# Patient Record
Sex: MALE | Race: WHITE | NOT HISPANIC OR LATINO | Employment: FULL TIME | URBAN - METROPOLITAN AREA
[De-identification: names, ages, dates, MRNs, and addresses within clinical notes are randomized per-mention and may not be internally consistent; named-entity substitution may affect disease eponyms.]

---

## 2020-11-04 ENCOUNTER — OFFICE VISIT (OUTPATIENT)
Dept: ENDOCRINOLOGY | Facility: CLINIC | Age: 50
End: 2020-11-04

## 2020-11-04 VITALS
OXYGEN SATURATION: 98 % | WEIGHT: 248.2 LBS | HEIGHT: 69 IN | BODY MASS INDEX: 36.76 KG/M2 | HEART RATE: 101 BPM | DIASTOLIC BLOOD PRESSURE: 78 MMHG | SYSTOLIC BLOOD PRESSURE: 128 MMHG

## 2020-11-04 DIAGNOSIS — IMO0002 DIABETES MELLITUS TYPE 1, UNCONTROLLED, WITH COMPLICATIONS: Primary | ICD-10-CM

## 2020-11-04 LAB — HBA1C MFR BLD: 6.4 %

## 2020-11-04 PROCEDURE — 99212 OFFICE O/P EST SF 10 MIN: CPT | Performed by: INTERNAL MEDICINE

## 2020-11-04 PROCEDURE — 83036 HEMOGLOBIN GLYCOSYLATED A1C: CPT | Performed by: INTERNAL MEDICINE

## 2020-11-04 RX ORDER — ROSUVASTATIN CALCIUM 20 MG/1
20 TABLET, COATED ORAL DAILY
Qty: 90 TABLET | Refills: 3 | Status: SHIPPED | OUTPATIENT
Start: 2020-11-04 | End: 2021-12-02

## 2020-11-04 RX ORDER — INSULIN ASPART INJECTION 100 [IU]/ML
INJECTION, SOLUTION SUBCUTANEOUS
Qty: 25 PEN | Refills: 11 | Status: SHIPPED | OUTPATIENT
Start: 2020-11-04 | End: 2021-10-05

## 2020-11-04 RX ORDER — INSULIN ASPART INJECTION 100 [IU]/ML
INJECTION, SOLUTION SUBCUTANEOUS
COMMUNITY
End: 2020-11-04 | Stop reason: SDUPTHER

## 2020-11-04 RX ORDER — ROSUVASTATIN CALCIUM 20 MG/1
20 TABLET, COATED ORAL DAILY
COMMUNITY
End: 2020-11-04 | Stop reason: SDUPTHER

## 2020-11-04 RX ORDER — LISINOPRIL 10 MG/1
10 TABLET ORAL DAILY
Qty: 90 TABLET | Refills: 3 | Status: SHIPPED | OUTPATIENT
Start: 2020-11-04 | End: 2021-12-02

## 2020-11-04 RX ORDER — INSULIN GLARGINE 300 U/ML
30 INJECTION, SOLUTION SUBCUTANEOUS 2 TIMES DAILY
Qty: 2 PEN | Refills: 11 | Status: SHIPPED | OUTPATIENT
Start: 2020-11-04 | End: 2021-12-02

## 2020-11-04 RX ORDER — INSULIN GLARGINE 300 U/ML
30 INJECTION, SOLUTION SUBCUTANEOUS 2 TIMES DAILY
COMMUNITY
End: 2020-11-04 | Stop reason: SDUPTHER

## 2020-11-04 RX ORDER — LISINOPRIL 10 MG/1
10 TABLET ORAL DAILY
COMMUNITY
End: 2020-11-04 | Stop reason: SDUPTHER

## 2020-11-04 NOTE — PROGRESS NOTES
"     Office Note      Date: 2020  Patient Name: Mendez Deluna  MRN: [id number]  : 1970    Chief Complaint   Patient presents with   • Follow-up   • Diabetes       History of Present Illness:   Mendez eDluna is a 49 y.o. male who presents for Diabetes type 1. Diagnosed in: . Treated in past with insulin. Current treatments:  fiasp and toujeo  Number of insulin shots per day: none. Checks blood sugar >4 times a day. Has low blood sugar: rare.     Last A1c:  Hemoglobin A1C   Date Value Ref Range Status   2020 6.4 % Final       Changes in health since last visit: none . Last eye exam up to date.    Subjective      Diabetic Complications:  Eyes: No  Kidneys: No  Feet: No  Heart: No    Diet and Exercise:  Meals per day: 3  Minutes of exercise per week: 150 mins.    Review of Systems:   Review of Systems   Constitutional: Negative.    Respiratory: Negative.    Genitourinary: Negative.    Neurological: Negative.        The following portions of the patient's history were reviewed and updated as appropriate: allergies, current medications, past family history, past medical history, past social history, past surgical history and problem list.    Objective     Visit Vitals  /78   Pulse 101   Ht 174 cm (68.5\")   Wt 113 kg (248 lb 3.2 oz)   SpO2 98%   BMI 37.19 kg/m²       Labs:    CMP  No results found for: GLUCOSE, BUN, CREATININE, EGFRIFNONA, EGFRIFAFRI, BCR, K, CO2, CALCIUM, PROTENTOTREF, LABIL2, BILIRUBIN, AST, ALT     CBC w/DIFF  No results found for: WBC, RBC, HGB, HCT, MCV, MCH, MCHC, RDW, RDWSD, MPV, PLT, NEUTRORELPCT, LYMPHORELPCT, MONORELPCT, EOSRELPCT, BASORELPCT, AUTOIGPER, NEUTROABS, LYMPHSABS, MONOSABS, EOSABS, BASOSABS, AUTOIGNUM, NRBC    Physical Exam:  Physical Exam  Constitutional:       Appearance: Normal appearance. He is obese.   Neck:      Musculoskeletal: Normal range of motion and neck supple.   Musculoskeletal: Normal range of motion.   Skin:     General: Skin is warm and " dry.   Neurological:      Mental Status: He is alert.   Psychiatric:         Mood and Affect: Mood normal.         Thought Content: Thought content normal.         Judgment: Judgment normal.          Assessment / Plan      Assessment & Plan:  Problem List Items Addressed This Visit        Endocrine    Diabetes mellitus type 1, uncontrolled, with complications (CMS/[facility]) - Primary    Relevant Medications    Insulin Glargine, 2 Unit Dial, (Toujeo Max SoloStar) 300 UNIT/ML solution pen-injector injection    Insulin aspart (FIASP FLEXTOUCH) 100 UNIT/ML solution pen-injector injection pen    Other Relevant Orders    POC Glycosylated Hemoglobin (Hb A1C) (Completed)           Everardo Troncoso MD   11/04/2020

## 2020-12-09 ENCOUNTER — PATIENT MESSAGE (OUTPATIENT)
Dept: ENDOCRINOLOGY | Facility: CLINIC | Age: 50
End: 2020-12-09

## 2020-12-10 RX ORDER — BLOOD SUGAR DIAGNOSTIC
STRIP MISCELLANEOUS
Qty: 150 EACH | Refills: 5 | Status: SHIPPED | OUTPATIENT
Start: 2020-12-10 | End: 2021-12-29

## 2020-12-10 NOTE — TELEPHONE ENCOUNTER
From: Mendez Deulna  To: Everardo Troncoso MD  Sent: 12/9/2020 3:09 PM EST  Subject: Prescription Question    I attempted to find test strips for my OneTouch Verio Flex in this system’s database to request a refill, but they did not show up. This is the glucose meter I had been using and I am out of strips. Is there a test meter to which I should switch? If not, how can I get strip refills for the Verio Flex meter?

## 2021-01-12 ENCOUNTER — IMMUNIZATION (OUTPATIENT)
Dept: VACCINE CLINIC | Facility: HOSPITAL | Age: 51
End: 2021-01-12

## 2021-01-12 PROCEDURE — 91300 HC SARSCOV02 VAC 30MCG/0.3ML IM: CPT | Performed by: FAMILY MEDICINE

## 2021-01-12 PROCEDURE — 0001A: CPT | Performed by: FAMILY MEDICINE

## 2021-02-02 ENCOUNTER — IMMUNIZATION (OUTPATIENT)
Dept: VACCINE CLINIC | Facility: HOSPITAL | Age: 51
End: 2021-02-02

## 2021-02-02 PROCEDURE — 0002A: CPT | Performed by: INTERNAL MEDICINE

## 2021-02-02 PROCEDURE — 91300 HC SARSCOV02 VAC 30MCG/0.3ML IM: CPT | Performed by: INTERNAL MEDICINE

## 2021-02-22 ENCOUNTER — TELEPHONE (OUTPATIENT)
Dept: ENDOCRINOLOGY | Facility: CLINIC | Age: 51
End: 2021-02-22

## 2021-02-22 NOTE — TELEPHONE ENCOUNTER
PT CALLED REQUESTING A REFILL OF THE Acquia MISHEL 2 SENSORS. REFILL TO BE SENT IN TO [facility]. PLEASE CONTACT THE PT WHEN RX IS SENT. PT IS OUT. THANK YOU.

## 2021-02-25 DIAGNOSIS — IMO0002 DIABETES MELLITUS TYPE 1, UNCONTROLLED, WITH COMPLICATIONS: Primary | ICD-10-CM

## 2021-03-01 NOTE — TELEPHONE ENCOUNTER
----- Message from Mendez Deluna sent at 2/28/2021 11:03 PM EST -----  Regarding: Prescription Question  Contact: [phone]  Hi Dr. Troncoso, I am sending this message to the Rx messaging as well, but it’s of a bit of urgency. I went to my pharmacy today and got my Freestyle Ronnie, and apparently I have been switched to the Ronnie 2. I did not realize this until sticking the bee Ronnie 2 onto my arm. The problem is that I do not have a reader for the Ronnie 2 to start the device. So, it’s in my arm but doing nothing at the moment. Can you have the Rx team quickly send my [facility] pharmacy a prescription for the new reader for me so I can pick it up ASAP?    Thanks much and happy Monday!

## 2021-03-30 RX ORDER — FLASH GLUCOSE SENSOR
1 KIT MISCELLANEOUS
Qty: 2 EACH | Refills: 5 | Status: SHIPPED | OUTPATIENT
Start: 2021-03-30 | End: 2022-01-10

## 2021-05-06 ENCOUNTER — OFFICE VISIT (OUTPATIENT)
Dept: ENDOCRINOLOGY | Facility: CLINIC | Age: 51
End: 2021-05-06

## 2021-05-06 VITALS
TEMPERATURE: 97.7 F | WEIGHT: 261 LBS | HEART RATE: 100 BPM | DIASTOLIC BLOOD PRESSURE: 50 MMHG | HEIGHT: 68 IN | SYSTOLIC BLOOD PRESSURE: 124 MMHG | OXYGEN SATURATION: 97 % | BODY MASS INDEX: 39.56 KG/M2

## 2021-05-06 DIAGNOSIS — E10.649 UNCONTROLLED TYPE 1 DIABETES MELLITUS WITH HYPOGLYCEMIA WITHOUT COMA (HCC): Primary | ICD-10-CM

## 2021-05-06 PROBLEM — IMO0002 DIABETES MELLITUS TYPE 1, UNCONTROLLED, WITH COMPLICATIONS: Status: RESOLVED | Noted: 2020-11-04 | Resolved: 2021-05-06

## 2021-05-06 LAB
EXPIRATION DATE: NORMAL
HBA1C MFR BLD: 6.1 %
Lab: NORMAL

## 2021-05-06 PROCEDURE — 83036 HEMOGLOBIN GLYCOSYLATED A1C: CPT | Performed by: INTERNAL MEDICINE

## 2021-05-06 PROCEDURE — 99213 OFFICE O/P EST LOW 20 MIN: CPT | Performed by: INTERNAL MEDICINE

## 2021-05-06 NOTE — PROGRESS NOTES
"     Office Note      Date: 2021  Patient Name: Mendez Deluna  MRN: [id number]  : 1970    Chief Complaint   Patient presents with   • Diabetes       History of Present Illness:   Mendez Deluna is a 50 y.o. male who presents for Diabetes - type 1.  He is on toujeo and fiasp.  He uses a lan to check bg and adjusts his insulin 10 times per day. He has an naa that alerts him  He has occasional hypos.  He has not had serious hypos     Last A1c:  Hemoglobin A1C   Date Value Ref Range Status   2021 6.1 % Final       Changes in health since last visit: he is fully vaccinated . Last eye exam due for eye exam   Feet- good. .    Subjective              Review of Systems:   Review of Systems   Constitutional: Negative.    HENT: Negative.    Eyes: Negative.    All other systems reviewed and are negative.      The following portions of the patient's history were reviewed and updated as appropriate: allergies, current medications, past family history, past medical history, past social history, past surgical history and problem list.    Objective     Visit Vitals  /50 (BP Location: Left arm, Patient Position: Sitting, Cuff Size: Adult)   Pulse 100   Temp 97.7 °F (36.5 °C) (Infrared)   Ht 172.7 cm (68\")   Wt 118 kg (261 lb)   SpO2 97%   BMI 39.68 kg/m²       Labs:    CMP  No results found for: GLUCOSE, BUN, CREATININE, EGFRIFNONA, EGFRIFAFRI, BCR, K, CO2, CALCIUM, PROTENTOTREF, LABIL2, BILIRUBIN, AST, ALT     CBC w/DIFF  No results found for: WBC, RBC, HGB, HCT, MCV, MCH, MCHC, RDW, RDWSD, MPV, PLT, NEUTRORELPCT, LYMPHORELPCT, MONORELPCT, EOSRELPCT, BASORELPCT, AUTOIGPER, NEUTROABS, LYMPHSABS, MONOSABS, EOSABS, BASOSABS, AUTOIGNUM, NRBC    Physical Exam:  Physical Exam  Vitals reviewed.   HENT:      Head: Normocephalic and atraumatic.   Psychiatric:         Mood and Affect: Mood normal.         Thought Content: Thought content normal.         Judgment: Judgment normal.          Assessment / Plan  "     Assessment & Plan:  Problem List Items Addressed This Visit        Other    Uncontrolled type 1 diabetes mellitus with hypoglycemia without coma (CMS/[facility]) - Primary    Current Assessment & Plan     a1c at goal  No changes are needed.            Relevant Medications    Insulin aspart (FIASP FLEXTOUCH) 100 UNIT/ML solution pen-injector injection pen    Insulin Glargine, 2 Unit Dial, (Toujeo Max SoloStar) 300 UNIT/ML solution pen-injector injection    Other Relevant Orders    POC Glycosylated Hemoglobin (Hb A1C) (Completed)           Everardo Troncoso MD   05/06/2021

## 2021-05-27 ENCOUNTER — PRIOR AUTHORIZATION (OUTPATIENT)
Dept: ENDOCRINOLOGY | Facility: CLINIC | Age: 51
End: 2021-05-27

## 2021-05-28 RX ORDER — PEN NEEDLE, DIABETIC 31 GX5/16"
NEEDLE, DISPOSABLE MISCELLANEOUS
Qty: 200 EACH | Refills: 6 | Status: SHIPPED | OUTPATIENT
Start: 2021-05-28 | End: 2022-06-01

## 2021-06-16 ENCOUNTER — TELEPHONE (OUTPATIENT)
Dept: ENDOCRINOLOGY | Facility: CLINIC | Age: 51
End: 2021-06-16

## 2021-06-16 NOTE — TELEPHONE ENCOUNTER
BLANCA PURI III (Key: [id number])  Fiasp FlexTouch 100UNIT/ML pen-injectors     Form  [employer] PA Form (2017 NCPDP)  Created  2 days ago  Sent to Plan  less than a minute ago  Plan Response  less than a minute ago  Submit Clinical Questions  Determination  N/A  Message from Plan  Authorization already on file for this request.

## 2021-06-30 ENCOUNTER — TELEPHONE (OUTPATIENT)
Dept: ENDOCRINOLOGY | Facility: CLINIC | Age: 51
End: 2021-06-30

## 2021-07-01 NOTE — TELEPHONE ENCOUNTER
Patient returned call and given message.   [FreeTextEntry1] : Hypertension likely related to beta-blocker withdrawal.  Resume beta-blocker follow-up with nephrologist

## 2021-07-19 ENCOUNTER — OFFICE VISIT (OUTPATIENT)
Dept: FAMILY MEDICINE CLINIC | Facility: CLINIC | Age: 51
End: 2021-07-19

## 2021-07-19 VITALS
OXYGEN SATURATION: 98 % | WEIGHT: 257.8 LBS | BODY MASS INDEX: 39.07 KG/M2 | DIASTOLIC BLOOD PRESSURE: 58 MMHG | SYSTOLIC BLOOD PRESSURE: 140 MMHG | HEART RATE: 96 BPM | TEMPERATURE: 97.3 F | HEIGHT: 68 IN

## 2021-07-19 DIAGNOSIS — E10.65 TYPE 1 DIABETES MELLITUS WITH HYPERGLYCEMIA (HCC): Primary | ICD-10-CM

## 2021-07-19 DIAGNOSIS — R60.0 BILATERAL LEG EDEMA: ICD-10-CM

## 2021-07-19 DIAGNOSIS — Z12.5 ENCOUNTER FOR SCREENING FOR MALIGNANT NEOPLASM OF PROSTATE: ICD-10-CM

## 2021-07-19 DIAGNOSIS — E10.69 HYPERLIPIDEMIA DUE TO TYPE 1 DIABETES MELLITUS (HCC): ICD-10-CM

## 2021-07-19 DIAGNOSIS — E78.5 HYPERLIPIDEMIA DUE TO TYPE 1 DIABETES MELLITUS (HCC): ICD-10-CM

## 2021-07-19 PROCEDURE — 93000 ELECTROCARDIOGRAM COMPLETE: CPT | Performed by: FAMILY MEDICINE

## 2021-07-19 PROCEDURE — 99204 OFFICE O/P NEW MOD 45 MIN: CPT | Performed by: FAMILY MEDICINE

## 2021-07-20 ENCOUNTER — LAB (OUTPATIENT)
Dept: FAMILY MEDICINE CLINIC | Facility: CLINIC | Age: 51
End: 2021-07-20

## 2021-07-20 DIAGNOSIS — E78.5 HYPERLIPIDEMIA DUE TO TYPE 1 DIABETES MELLITUS (HCC): ICD-10-CM

## 2021-07-20 DIAGNOSIS — E10.65 TYPE 1 DIABETES MELLITUS WITH HYPERGLYCEMIA (HCC): ICD-10-CM

## 2021-07-20 DIAGNOSIS — E10.69 HYPERLIPIDEMIA DUE TO TYPE 1 DIABETES MELLITUS (HCC): ICD-10-CM

## 2021-07-20 DIAGNOSIS — Z12.5 ENCOUNTER FOR SCREENING FOR MALIGNANT NEOPLASM OF PROSTATE: ICD-10-CM

## 2021-07-20 DIAGNOSIS — R60.0 BILATERAL LEG EDEMA: ICD-10-CM

## 2021-07-20 PROCEDURE — 84443 ASSAY THYROID STIM HORMONE: CPT | Performed by: FAMILY MEDICINE

## 2021-07-20 PROCEDURE — 83880 ASSAY OF NATRIURETIC PEPTIDE: CPT | Performed by: FAMILY MEDICINE

## 2021-07-20 PROCEDURE — 83036 HEMOGLOBIN GLYCOSYLATED A1C: CPT | Performed by: FAMILY MEDICINE

## 2021-07-20 PROCEDURE — 80061 LIPID PANEL: CPT | Performed by: FAMILY MEDICINE

## 2021-07-20 PROCEDURE — 84439 ASSAY OF FREE THYROXINE: CPT | Performed by: FAMILY MEDICINE

## 2021-07-20 PROCEDURE — G0103 PSA SCREENING: HCPCS | Performed by: FAMILY MEDICINE

## 2021-07-20 PROCEDURE — 82043 UR ALBUMIN QUANTITATIVE: CPT | Performed by: FAMILY MEDICINE

## 2021-07-20 PROCEDURE — 85025 COMPLETE CBC W/AUTO DIFF WBC: CPT | Performed by: FAMILY MEDICINE

## 2021-07-20 PROCEDURE — 80053 COMPREHEN METABOLIC PANEL: CPT | Performed by: FAMILY MEDICINE

## 2021-07-20 PROCEDURE — 36415 COLL VENOUS BLD VENIPUNCTURE: CPT

## 2021-07-21 LAB
ALBUMIN SERPL-MCNC: 4.3 G/DL (ref 3.5–5.2)
ALBUMIN UR-MCNC: <1.2 MG/DL
ALBUMIN/GLOB SERPL: 1.4 G/DL
ALP SERPL-CCNC: 53 U/L (ref 39–117)
ALT SERPL W P-5'-P-CCNC: 39 U/L (ref 1–41)
ANION GAP SERPL CALCULATED.3IONS-SCNC: 11 MMOL/L (ref 5–15)
AST SERPL-CCNC: 29 U/L (ref 1–40)
BASOPHILS # BLD AUTO: 0.02 10*3/MM3 (ref 0–0.2)
BASOPHILS NFR BLD AUTO: 0.3 % (ref 0–1.5)
BILIRUB SERPL-MCNC: 0.5 MG/DL (ref 0–1.2)
BUN SERPL-MCNC: 10 MG/DL (ref 6–20)
BUN/CREAT SERPL: 11.4 (ref 7–25)
CALCIUM SPEC-SCNC: 8.8 MG/DL (ref 8.6–10.5)
CHLORIDE SERPL-SCNC: 97 MMOL/L (ref 98–107)
CHOLEST SERPL-MCNC: 170 MG/DL (ref 0–200)
CO2 SERPL-SCNC: 27 MMOL/L (ref 22–29)
CREAT SERPL-MCNC: 0.88 MG/DL (ref 0.76–1.27)
DEPRECATED RDW RBC AUTO: 41.9 FL (ref 37–54)
EOSINOPHIL # BLD AUTO: 0.07 10*3/MM3 (ref 0–0.4)
EOSINOPHIL NFR BLD AUTO: 1 % (ref 0.3–6.2)
ERYTHROCYTE [DISTWIDTH] IN BLOOD BY AUTOMATED COUNT: 12.7 % (ref 12.3–15.4)
GFR SERPL CREATININE-BSD FRML MDRD: 91 ML/MIN/1.73
GLOBULIN UR ELPH-MCNC: 3 GM/DL
GLUCOSE SERPL-MCNC: 176 MG/DL (ref 65–99)
HBA1C MFR BLD: 6.3 % (ref 4.8–5.6)
HCT VFR BLD AUTO: 46.1 % (ref 37.5–51)
HDLC SERPL-MCNC: 39 MG/DL (ref 40–60)
HGB BLD-MCNC: 15.5 G/DL (ref 13–17.7)
IMM GRANULOCYTES # BLD AUTO: 0.02 10*3/MM3 (ref 0–0.05)
IMM GRANULOCYTES NFR BLD AUTO: 0.3 % (ref 0–0.5)
LDLC SERPL CALC-MCNC: 113 MG/DL (ref 0–100)
LDLC/HDLC SERPL: 2.87 {RATIO}
LYMPHOCYTES # BLD AUTO: 1.2 10*3/MM3 (ref 0.7–3.1)
LYMPHOCYTES NFR BLD AUTO: 16.9 % (ref 19.6–45.3)
MCH RBC QN AUTO: 29.9 PG (ref 26.6–33)
MCHC RBC AUTO-ENTMCNC: 33.6 G/DL (ref 31.5–35.7)
MCV RBC AUTO: 89 FL (ref 79–97)
MONOCYTES # BLD AUTO: 0.67 10*3/MM3 (ref 0.1–0.9)
MONOCYTES NFR BLD AUTO: 9.4 % (ref 5–12)
NEUTROPHILS NFR BLD AUTO: 5.13 10*3/MM3 (ref 1.7–7)
NEUTROPHILS NFR BLD AUTO: 72.1 % (ref 42.7–76)
NRBC BLD AUTO-RTO: 0 /100 WBC (ref 0–0.2)
NT-PROBNP SERPL-MCNC: <5 PG/ML (ref 0–900)
PLATELET # BLD AUTO: 192 10*3/MM3 (ref 140–450)
PMV BLD AUTO: 12.4 FL (ref 6–12)
POTASSIUM SERPL-SCNC: 4 MMOL/L (ref 3.5–5.2)
PROT SERPL-MCNC: 7.3 G/DL (ref 6–8.5)
PSA SERPL-MCNC: 0.64 NG/ML (ref 0–4)
RBC # BLD AUTO: 5.18 10*6/MM3 (ref 4.14–5.8)
SODIUM SERPL-SCNC: 135 MMOL/L (ref 136–145)
T4 FREE SERPL-MCNC: 1.09 NG/DL (ref 0.93–1.7)
TRIGL SERPL-MCNC: 96 MG/DL (ref 0–150)
TSH SERPL DL<=0.05 MIU/L-ACNC: 1.06 UIU/ML (ref 0.27–4.2)
VLDLC SERPL-MCNC: 18 MG/DL (ref 5–40)
WBC # BLD AUTO: 7.11 10*3/MM3 (ref 3.4–10.8)

## 2021-08-17 ENCOUNTER — TELEPHONE (OUTPATIENT)
Dept: FAMILY MEDICINE CLINIC | Facility: CLINIC | Age: 51
End: 2021-08-17

## 2021-08-18 NOTE — TELEPHONE ENCOUNTER
For him, unfortunately yes, but not until 8 months after his previous shot.      Left a message to return call.      Left a second message to return call.    Patient notified.

## 2021-09-09 ENCOUNTER — OFFICE VISIT (OUTPATIENT)
Dept: FAMILY MEDICINE CLINIC | Facility: CLINIC | Age: 51
End: 2021-09-09

## 2021-09-09 VITALS
OXYGEN SATURATION: 98 % | HEIGHT: 68 IN | WEIGHT: 262.4 LBS | DIASTOLIC BLOOD PRESSURE: 62 MMHG | SYSTOLIC BLOOD PRESSURE: 120 MMHG | BODY MASS INDEX: 39.77 KG/M2 | TEMPERATURE: 97.1 F | HEART RATE: 104 BPM

## 2021-09-09 DIAGNOSIS — M25.532 LEFT WRIST PAIN: Primary | ICD-10-CM

## 2021-09-09 DIAGNOSIS — R60.0 LOCALIZED EDEMA: ICD-10-CM

## 2021-09-09 DIAGNOSIS — M65.4 TENOSYNOVITIS, DE QUERVAIN: ICD-10-CM

## 2021-09-09 PROCEDURE — 99214 OFFICE O/P EST MOD 30 MIN: CPT | Performed by: FAMILY MEDICINE

## 2021-09-09 RX ORDER — FUROSEMIDE 20 MG/1
20 TABLET ORAL DAILY PRN
Qty: 30 TABLET | Refills: 2 | Status: SHIPPED | OUTPATIENT
Start: 2021-09-09 | End: 2021-12-14 | Stop reason: DRUGHIGH

## 2021-09-09 NOTE — PROGRESS NOTES
"Mendez Deluna     VITALS: Blood pressure 120/62, pulse 104, temperature 97.1 °F (36.2 °C), height 172.7 cm (67.99\"), weight 119 kg (262 lb 6.4 oz), SpO2 98 %.    Subjective  Chief Complaint  Wrist Pain    Subjective          History of Present Illness:  Patient is a 50 y.o.  male with medical conditions significant for type 1 diabetes, hyperlipidemia, and chronic back pain who presents to clinic secondary to medical followup.     The labs from the patient's last visit were within normal limits. He states that his left wrist has been bothering him more, especially when he puts it in the wrong position and at nighttime. He denies that it is painful; however, he feels as if something is wrong. He reports that he continued to use it when he first noticed his wrist and then he suddenly had increased symptoms and his carpal tunnel symptoms have worsened as well. He denies falling on his left wrist; however, he uses it more than his right.    He reports that he does need to lose weight; however, his work keeps him from sleeping normal hours or having time to exercise. He is wanting to know the underlying cause of his water retention and would like to begin taking Lasix. He reports that he has taken a low dose of Lasix in the past and did well on them. He states that he closely monitors his glucose levels and denies having any syncope episodes.     He reports that he is having some issues with his insurance coverage.     No complaints about any of the medications.    The following portions of the patient's history were reviewed and updated as appropriate: allergies, current medications, past family history, past medical history, past social history, past surgical history and problem list.    Past Medical History  Past Medical History:   Diagnosis Date   • Acquired trigger finger    • Back pain    • Chest pain    • Hypercholesterolemia    • Type 1 diabetes mellitus (CMS/formerly [facility])        Surgical History  Past Surgical History: " "  Procedure Laterality Date   • WISDOM TOOTH EXTRACTION         Family History  Family History   Problem Relation Age of Onset   • Cancer Mother    • Hyperlipidemia Mother    • Thyroid cancer Mother    • Diabetes Father    • Hyperlipidemia Father    • Heart disease Father         Quad bypass       Social History  Social History     Socioeconomic History   • Marital status: Single     Spouse name: Not on file   • Number of children: Not on file   • Years of education: Not on file   • Highest education level: Not on file   Tobacco Use   • Smoking status: Never Smoker   • Smokeless tobacco: Never Used   Vaping Use   • Vaping Use: Never used   Substance and Sexual Activity   • Alcohol use: Yes     Comment: occasionally   • Drug use: Never   • Sexual activity: Defer       Objective   Vital Signs:   /62 (BP Location: Right arm, Patient Position: Sitting, Cuff Size: Adult)   Pulse 104   Temp 97.1 °F (36.2 °C)   Ht 172.7 cm (67.99\")   Wt 119 kg (262 lb 6.4 oz)   SpO2 98%   BMI 39.91 kg/m²     Physical Exam     Gen: Patient in NAD. Pleasant and answers appropriately. A&Ox3.    Skin: Warm and dry with normal turgor. No purpura, rashes, or unusual pigmentation noted. Hair is normal in appearance and distribution.    HEENT: NC/AT. No lesions noted. Conjunctiva clear, sclera nonicteric. PERRL. EOMI without nystagmus or strabismus. Fundi appear benign. No hemorrhages or exudates of eyes. Auditory canals are patent bilaterally without lesions. TMs intact,  nonerythematous, nonbulging without lesions. Nasal mucosa pink, nonerythematous, and nonedematous. Frontal and maxillary sinuses are nontender. O/P erythematous and moist without exudate.    Neck: Supple without lymph nodes palpated. FROM.     Lungs: CTA B/L without rales, rhonchi, crackles, or wheezes.    Heart: RRR. S1 and S2 normal. No S3 or S4. No MRGT.    Abd: Soft, nontender,nondistended. (+)BSx4 quadrants.     Extrem: No CC.  Slight edema to bilateral lower " extremities.  Radial pulses 2+/4 and equal B/L. FROMx4.  Left upper extremity: Painful in the anatomic snuffbox    Neuro: No focal motor/sensory deficits.    Procedures         Assessment and Plan    Mendez Deluna is a 50 y.o. here for medical followup.    Problem List Items Addressed This Visit     None      Visit Diagnoses     Left wrist pain    -  Primary    Relevant Orders    XR Hand 3+ View Left    De Quervain tenosynovitis          I have provided the patient with home exercises.     Localized edema           I have prescribed him Lasix.            Patient's Body mass index is 39.91 kg/m². indicating that he is obese (BMI >30). Obesity-related health conditions include the following: hypertension and dyslipidemias. Obesity is unchanged. BMI is is above average; BMI management plan is completed. We discussed portion control and increasing exercise..         Follow Up   Return in about 3 months (around 12/9/2021), or XRAY.  Findings and plans discussed with patient who verbalizes understanding and agreement. Will followup with patient once results are in. Patient was given instructions and counseling regarding his condition or for health maintenance advice. Please see specific information pulled into the AVS if appropriate.       Transcribed from ambient dictation for Magalis Malcolm MD by Eddy Sarmiento.  09/09/21   15:06 EDT    I have personally performed the services described in this document as transcribed by the above individual, and it is both accurate and complete.  Magalis Malcolm MD  9/27/2021  07:33 EDT

## 2021-09-10 ENCOUNTER — APPOINTMENT (OUTPATIENT)
Dept: VACCINE CLINIC | Facility: HOSPITAL | Age: 51
End: 2021-09-10

## 2021-09-13 ENCOUNTER — TELEPHONE (OUTPATIENT)
Dept: FAMILY MEDICINE CLINIC | Facility: CLINIC | Age: 51
End: 2021-09-13

## 2021-09-13 NOTE — TELEPHONE ENCOUNTER
----- Message from Magalis Malcolm MD sent at 9/12/2021 12:50 PM EDT -----  Labs stable. Okay to either call or send letter to patient. Thanks.    Letter mailed.

## 2021-09-24 ENCOUNTER — APPOINTMENT (OUTPATIENT)
Dept: VACCINE CLINIC | Facility: HOSPITAL | Age: 51
End: 2021-09-24

## 2021-10-01 ENCOUNTER — IMMUNIZATION (OUTPATIENT)
Dept: VACCINE CLINIC | Facility: HOSPITAL | Age: 51
End: 2021-10-01

## 2021-10-01 PROCEDURE — 91300 HC SARSCOV02 VAC 30MCG/0.3ML IM: CPT | Performed by: INTERNAL MEDICINE

## 2021-10-01 PROCEDURE — 0003A: CPT | Performed by: INTERNAL MEDICINE

## 2021-10-01 PROCEDURE — 0004A ADM SARSCOV2 30MCG/0.3ML BOOSTER: CPT | Performed by: INTERNAL MEDICINE

## 2021-10-05 DIAGNOSIS — IMO0002 DIABETES MELLITUS TYPE 1, UNCONTROLLED, WITH COMPLICATIONS: ICD-10-CM

## 2021-10-05 RX ORDER — INSULIN ASPART INJECTION 100 [IU]/ML
INJECTION, SOLUTION SUBCUTANEOUS
Qty: 75 ML | Refills: 5 | Status: SHIPPED | OUTPATIENT
Start: 2021-10-05 | End: 2022-05-03

## 2021-10-08 ENCOUNTER — APPOINTMENT (OUTPATIENT)
Dept: VACCINE CLINIC | Facility: HOSPITAL | Age: 51
End: 2021-10-08

## 2021-11-19 RX ORDER — FLASH GLUCOSE SCANNING READER
1 EACH MISCELLANEOUS TAKE AS DIRECTED
Qty: 1 EACH | Refills: 0 | Status: SHIPPED | OUTPATIENT
Start: 2021-11-19 | End: 2022-10-19

## 2021-12-02 DIAGNOSIS — IMO0002 DIABETES MELLITUS TYPE 1, UNCONTROLLED, WITH COMPLICATIONS: ICD-10-CM

## 2021-12-02 RX ORDER — INSULIN GLARGINE 300 U/ML
30 INJECTION, SOLUTION SUBCUTANEOUS 2 TIMES DAILY
Qty: 6 ML | Refills: 5 | Status: SHIPPED | OUTPATIENT
Start: 2021-12-02 | End: 2022-05-03

## 2021-12-02 RX ORDER — LISINOPRIL 10 MG/1
TABLET ORAL
Qty: 90 TABLET | Refills: 1 | Status: SHIPPED | OUTPATIENT
Start: 2021-12-02 | End: 2022-05-03

## 2021-12-02 RX ORDER — ROSUVASTATIN CALCIUM 20 MG/1
TABLET, COATED ORAL
Qty: 90 TABLET | Refills: 1 | Status: SHIPPED | OUTPATIENT
Start: 2021-12-02 | End: 2022-05-03

## 2021-12-14 ENCOUNTER — OFFICE VISIT (OUTPATIENT)
Dept: FAMILY MEDICINE CLINIC | Facility: CLINIC | Age: 51
End: 2021-12-14

## 2021-12-14 VITALS
WEIGHT: 255.6 LBS | DIASTOLIC BLOOD PRESSURE: 62 MMHG | TEMPERATURE: 97.3 F | HEART RATE: 100 BPM | SYSTOLIC BLOOD PRESSURE: 126 MMHG | HEIGHT: 68 IN | OXYGEN SATURATION: 98 % | BODY MASS INDEX: 38.74 KG/M2

## 2021-12-14 DIAGNOSIS — E10.65 TYPE 1 DIABETES MELLITUS WITH HYPERGLYCEMIA: Primary | ICD-10-CM

## 2021-12-14 DIAGNOSIS — M65.4 TENOSYNOVITIS, DE QUERVAIN: ICD-10-CM

## 2021-12-14 DIAGNOSIS — R60.0 LOCALIZED EDEMA: ICD-10-CM

## 2021-12-14 DIAGNOSIS — R25.3 EYELID TWITCH: ICD-10-CM

## 2021-12-14 PROCEDURE — 99214 OFFICE O/P EST MOD 30 MIN: CPT | Performed by: FAMILY MEDICINE

## 2021-12-14 RX ORDER — FUROSEMIDE 40 MG/1
40 TABLET ORAL DAILY
Qty: 90 TABLET | Refills: 1 | Status: SHIPPED | OUTPATIENT
Start: 2021-12-14 | End: 2022-03-16 | Stop reason: SDUPTHER

## 2021-12-14 RX ORDER — FUROSEMIDE 20 MG/1
20 TABLET ORAL DAILY PRN
Qty: 30 TABLET | Refills: 2 | Status: CANCELLED | OUTPATIENT
Start: 2021-12-14

## 2021-12-29 RX ORDER — BLOOD SUGAR DIAGNOSTIC
STRIP MISCELLANEOUS
Qty: 150 EACH | Refills: 5 | Status: SHIPPED | OUTPATIENT
Start: 2021-12-29 | End: 2022-10-28 | Stop reason: SDUPTHER

## 2021-12-30 ENCOUNTER — LAB (OUTPATIENT)
Dept: FAMILY MEDICINE CLINIC | Facility: CLINIC | Age: 51
End: 2021-12-30

## 2021-12-30 DIAGNOSIS — E10.65 TYPE 1 DIABETES MELLITUS WITH HYPERGLYCEMIA: ICD-10-CM

## 2021-12-30 DIAGNOSIS — R60.0 LOCALIZED EDEMA: ICD-10-CM

## 2021-12-30 PROCEDURE — 36415 COLL VENOUS BLD VENIPUNCTURE: CPT

## 2021-12-30 PROCEDURE — 83036 HEMOGLOBIN GLYCOSYLATED A1C: CPT | Performed by: FAMILY MEDICINE

## 2021-12-30 PROCEDURE — 80053 COMPREHEN METABOLIC PANEL: CPT | Performed by: FAMILY MEDICINE

## 2021-12-31 LAB
ALBUMIN SERPL-MCNC: 4.3 G/DL (ref 3.5–5.2)
ALBUMIN/GLOB SERPL: 1.5 G/DL
ALP SERPL-CCNC: 43 U/L (ref 39–117)
ALT SERPL W P-5'-P-CCNC: 31 U/L (ref 1–41)
ANION GAP SERPL CALCULATED.3IONS-SCNC: 12.2 MMOL/L (ref 5–15)
AST SERPL-CCNC: 21 U/L (ref 1–40)
BILIRUB SERPL-MCNC: 0.3 MG/DL (ref 0–1.2)
BUN SERPL-MCNC: 18 MG/DL (ref 6–20)
BUN/CREAT SERPL: 28.1 (ref 7–25)
CALCIUM SPEC-SCNC: 8.9 MG/DL (ref 8.6–10.5)
CHLORIDE SERPL-SCNC: 98 MMOL/L (ref 98–107)
CO2 SERPL-SCNC: 27.8 MMOL/L (ref 22–29)
CREAT SERPL-MCNC: 0.64 MG/DL (ref 0.76–1.27)
GFR SERPL CREATININE-BSD FRML MDRD: 131 ML/MIN/1.73
GLOBULIN UR ELPH-MCNC: 2.9 GM/DL
GLUCOSE SERPL-MCNC: 102 MG/DL (ref 65–99)
HBA1C MFR BLD: 6.71 % (ref 4.8–5.6)
POTASSIUM SERPL-SCNC: 4.3 MMOL/L (ref 3.5–5.2)
PROT SERPL-MCNC: 7.2 G/DL (ref 6–8.5)
SODIUM SERPL-SCNC: 138 MMOL/L (ref 136–145)

## 2022-01-03 ENCOUNTER — TELEPHONE (OUTPATIENT)
Dept: FAMILY MEDICINE CLINIC | Facility: CLINIC | Age: 52
End: 2022-01-03

## 2022-01-03 NOTE — TELEPHONE ENCOUNTER
----- Message from Magalis Malcolm MD sent at 1/2/2022 11:00 PM EST -----  Please call Thor and let him know that his A1C went up from 6.3 to 6.7 time. I'm going to have him monitor and maybe cut back on some of his carbs. If it continues to go up, we'll increase his meds, but I think he can bring it back down now that the holidays are over.      Left a message to return call.    Left a second message to return call.    Left a third message to return call.      Letter mailed.

## 2022-01-10 RX ORDER — FLASH GLUCOSE SENSOR
KIT MISCELLANEOUS
Qty: 6 EACH | Refills: 3 | Status: SHIPPED | OUTPATIENT
Start: 2022-01-10

## 2022-02-11 ENCOUNTER — TELEPHONE (OUTPATIENT)
Dept: FAMILY MEDICINE CLINIC | Facility: CLINIC | Age: 52
End: 2022-02-11

## 2022-02-11 NOTE — TELEPHONE ENCOUNTER
be     Caller: Mendez Deluna    Relationship to patient: Self    Best call back number: [phone]    Date of exposure: NA    Date of positive COVID19 test: 2/10/22 HOME TEST    Date if possible COVID19 exposure: NA    COVID19 symptoms: MILD COLD SYMPTOMS, HEADACHE    Date of initial quarantine:  NA    Additional information or concerns: PATIENT REQUESTING IF HE AND ORDER ANTIBODY TEST IN [place].     PATIENT TESTED COVID POSITIVE FROM HOME TEST YESTERDAY    What is the patients preferred pharmacy: [address] [phone] [facility] [phone]   [phone]

## 2022-02-11 NOTE — TELEPHONE ENCOUNTER
be     Caller: Mendez Deluna    Relationship to patient: Self    Best call back number: [phone]    Date of exposure: NA    Date of positive COVID19 test: 2/10/22 HOME TEST    Date if possible COVID19 exposure: NA    COVID19 symptoms: MILD COLD SYMPTOMS, HEADACHE    Date of initial quarantine:  NA    Additional information or concerns: PATIENT REQUESTING IF HE AND ORDER ANTIBODY TEST IN [place].     PATIENT TESTED COVID POSITIVE FROM HOME TEST YESTERDAY    What is the patients preferred pharmacy: [address] [phone] [facility] [phone]   [phone]    Spoke with wife they want to go to [place] for the Monoclonal Antibody infusion but they are requiring a letter stating from their primary with reason they are high risk & should receive it.

## 2022-02-11 NOTE — TELEPHONE ENCOUNTER
Letter written. Should appear in his MyChart should you want to give him a call. His risk factor is type I diabetes.      Patient notified & verbalized understanding.

## 2022-02-11 NOTE — TELEPHONE ENCOUNTER
Letter written. Should appear in his MyChart should you want to give him a call. His risk factor is type I diabetes.

## 2022-03-16 ENCOUNTER — OFFICE VISIT (OUTPATIENT)
Dept: ENDOCRINOLOGY | Facility: CLINIC | Age: 52
End: 2022-03-16

## 2022-03-16 VITALS
BODY MASS INDEX: 39.25 KG/M2 | DIASTOLIC BLOOD PRESSURE: 66 MMHG | SYSTOLIC BLOOD PRESSURE: 122 MMHG | HEART RATE: 89 BPM | OXYGEN SATURATION: 98 % | WEIGHT: 259 LBS | HEIGHT: 68 IN

## 2022-03-16 DIAGNOSIS — R60.0 LOCALIZED EDEMA: ICD-10-CM

## 2022-03-16 DIAGNOSIS — E10.649 UNCONTROLLED TYPE 1 DIABETES MELLITUS WITH HYPOGLYCEMIA WITHOUT COMA: Primary | ICD-10-CM

## 2022-03-16 LAB
EXPIRATION DATE: ABNORMAL
EXPIRATION DATE: NORMAL
GLUCOSE BLDC GLUCOMTR-MCNC: 69 MG/DL (ref 70–130)
HBA1C MFR BLD: 6.2 %
Lab: ABNORMAL
Lab: NORMAL

## 2022-03-16 PROCEDURE — 82947 ASSAY GLUCOSE BLOOD QUANT: CPT | Performed by: INTERNAL MEDICINE

## 2022-03-16 PROCEDURE — 83036 HEMOGLOBIN GLYCOSYLATED A1C: CPT | Performed by: INTERNAL MEDICINE

## 2022-03-16 PROCEDURE — 99213 OFFICE O/P EST LOW 20 MIN: CPT | Performed by: INTERNAL MEDICINE

## 2022-03-16 RX ORDER — FUROSEMIDE 40 MG/1
40 TABLET ORAL DAILY
Qty: 90 TABLET | Refills: 1 | Status: SHIPPED | OUTPATIENT
Start: 2022-03-16 | End: 2022-06-20 | Stop reason: SDUPTHER

## 2022-03-16 NOTE — PROGRESS NOTES
"     Office Note      Date: 2022  Patient Name: Mendez Deluna  MRN: [id number]  : 1970    Chief Complaint   Patient presents with   • Diabetes       History of Present Illness:   Mendez Deluna is a 51 y.o. male who presents for Diabetes - TYPE 1  USING TOUJEO AND   FIASP  HE USES A  MISHEL TO CHECK HIS BLOOD SUGAR AND  A CHINESE READER TO TRANSMIT HIS READINGS.  HE USES LASIX FOR EDEMA.     FASTING LABS ARE UP TO DATE    Last A1c:  Hemoglobin A1C   Date Value Ref Range Status   2022 6.2 % Final   2021 6.71 (H) 4.80 - 5.60 % Final       Changes in health since last visit: NONE . Last eye exam DUE AND ADVISED .    Subjective          Review of Systems:   Review of Systems   Constitutional: Negative.    HENT: Negative.    Eyes: Negative.    Respiratory: Negative.        The following portions of the patient's history were reviewed and updated as appropriate: allergies, current medications, past family history, past medical history, past social history, past surgical history and problem list.    Objective     Visit Vitals  /66   Pulse 89   Ht 172.7 cm (68\")   Wt 117 kg (259 lb)   SpO2 98%   BMI 39.38 kg/m²       Labs:    CMP  Lab Results   Component Value Date    GLUCOSE 102 (H) 2021    BUN 18 2021    CREATININE 0.64 (L) 2021    EGFRIFNONA 131 2021    BCR 28.1 (H) 2021    K 4.3 2021    CO2 27.8 2021    CALCIUM 8.9 2021    AST 21 2021    ALT 31 2021        CBC w/DIFF  Lab Results   Component Value Date    WBC 7.11 2021    RBC 5.18 2021    HGB 15.5 2021    HCT 46.1 2021    MCV 89.0 2021    MCH 29.9 2021    MCHC 33.6 2021    RDW 12.7 2021    RDWSD 41.9 2021    MPV 12.4 (H) 2021     2021    NEUTRORELPCT 72.1 2021    LYMPHORELPCT 16.9 (L) 2021    MONORELPCT 9.4 2021    EOSRELPCT 1.0 2021    BASORELPCT 0.3 2021    AUTOIGPER 0.3 " 07/20/2021    NEUTROABS 5.13 07/20/2021    LYMPHSABS 1.20 07/20/2021    MONOSABS 0.67 07/20/2021    EOSABS 0.07 07/20/2021    BASOSABS 0.02 07/20/2021    AUTOIGNUM 0.02 07/20/2021    NRBC 0.0 07/20/2021       Physical Exam:  Physical Exam  Vitals reviewed.   Constitutional:       Appearance: Normal appearance. He is normal weight.   Cardiovascular:      Pulses:           Dorsalis pedis pulses are 2+ on the right side and 2+ on the left side.        Posterior tibial pulses are 2+ on the right side and 2+ on the left side.   Musculoskeletal:      Right foot: Normal range of motion. No deformity, bunion, Charcot foot, foot drop or prominent metatarsal heads.      Left foot: Normal range of motion. No deformity, bunion, Charcot foot, foot drop or prominent metatarsal heads.   Feet:      Right foot:      Protective Sensation: 5 sites tested. 5 sites sensed.      Skin integrity: Skin integrity normal.      Toenail Condition: Right toenails are normal.      Left foot:      Protective Sensation: 5 sites tested. 5 sites sensed.      Skin integrity: Skin integrity normal.      Toenail Condition: Left toenails are normal.      Comments: LEG EDEMA WITH VARICOSITIES    Diabetic Foot Exam Performed and Monofilament Test Performed  Neurological:      Mental Status: He is alert.   Psychiatric:         Mood and Affect: Mood normal.         Thought Content: Thought content normal.         Judgment: Judgment normal.          Assessment / Plan      Assessment & Plan:  Problem List Items Addressed This Visit        Other    Uncontrolled type 1 diabetes mellitus with hypoglycemia without coma (HCC) - Primary    Current Assessment & Plan     Diabetes is improving with treatment.   Continue current treatment regimen.  Diabetes will be reassessed in 6 months.           Relevant Medications    Insulin aspart (FIASP FLEXTOUCH) 100 UNIT/ML solution pen-injector injection pen    Insulin Glargine, 2 Unit Dial, (Toujeo Max SoloStar) 300 UNIT/ML  solution pen-injector injection    Other Relevant Orders    POC Glucose, Blood (Completed)    POC Glycosylated Hemoglobin (Hb A1C) (Completed)           Everardo Troncoso MD   03/16/2022

## 2022-03-18 ENCOUNTER — OFFICE VISIT (OUTPATIENT)
Dept: FAMILY MEDICINE CLINIC | Facility: CLINIC | Age: 52
End: 2022-03-18

## 2022-03-18 VITALS
BODY MASS INDEX: 40.44 KG/M2 | HEIGHT: 68 IN | HEART RATE: 89 BPM | WEIGHT: 266.8 LBS | DIASTOLIC BLOOD PRESSURE: 80 MMHG | SYSTOLIC BLOOD PRESSURE: 140 MMHG | TEMPERATURE: 97.1 F | OXYGEN SATURATION: 98 %

## 2022-03-18 DIAGNOSIS — M25.512 CHRONIC PAIN OF BOTH SHOULDERS: ICD-10-CM

## 2022-03-18 DIAGNOSIS — M54.40 CHRONIC BILATERAL LOW BACK PAIN WITH SCIATICA, SCIATICA LATERALITY UNSPECIFIED: ICD-10-CM

## 2022-03-18 DIAGNOSIS — Z12.11 SCREENING FOR MALIGNANT NEOPLASM OF COLON: ICD-10-CM

## 2022-03-18 DIAGNOSIS — G89.29 CHRONIC BILATERAL LOW BACK PAIN WITH SCIATICA, SCIATICA LATERALITY UNSPECIFIED: ICD-10-CM

## 2022-03-18 DIAGNOSIS — M25.511 CHRONIC PAIN OF BOTH SHOULDERS: ICD-10-CM

## 2022-03-18 DIAGNOSIS — M79.602 LEFT ARM PAIN: ICD-10-CM

## 2022-03-18 DIAGNOSIS — E10.65 TYPE 1 DIABETES MELLITUS WITH HYPERGLYCEMIA: ICD-10-CM

## 2022-03-18 DIAGNOSIS — R60.0 LOCALIZED EDEMA: Primary | ICD-10-CM

## 2022-03-18 DIAGNOSIS — G89.29 CHRONIC PAIN OF BOTH SHOULDERS: ICD-10-CM

## 2022-03-18 PROCEDURE — 99214 OFFICE O/P EST MOD 30 MIN: CPT | Performed by: FAMILY MEDICINE

## 2022-03-18 PROCEDURE — 80053 COMPREHEN METABOLIC PANEL: CPT | Performed by: FAMILY MEDICINE

## 2022-03-18 PROCEDURE — 36415 COLL VENOUS BLD VENIPUNCTURE: CPT | Performed by: FAMILY MEDICINE

## 2022-03-19 LAB
ALBUMIN SERPL-MCNC: 4.2 G/DL (ref 3.5–5.2)
ALBUMIN/GLOB SERPL: 1.6 G/DL
ALP SERPL-CCNC: 48 U/L (ref 39–117)
ALT SERPL W P-5'-P-CCNC: 27 U/L (ref 1–41)
ANION GAP SERPL CALCULATED.3IONS-SCNC: 10 MMOL/L (ref 5–15)
AST SERPL-CCNC: 18 U/L (ref 1–40)
BILIRUB SERPL-MCNC: 0.2 MG/DL (ref 0–1.2)
BUN SERPL-MCNC: 13 MG/DL (ref 6–20)
BUN/CREAT SERPL: 16.5 (ref 7–25)
CALCIUM SPEC-SCNC: 8.7 MG/DL (ref 8.6–10.5)
CHLORIDE SERPL-SCNC: 101 MMOL/L (ref 98–107)
CO2 SERPL-SCNC: 25 MMOL/L (ref 22–29)
CREAT SERPL-MCNC: 0.79 MG/DL (ref 0.76–1.27)
EGFRCR SERPLBLD CKD-EPI 2021: 106.2 ML/MIN/1.73
GLOBULIN UR ELPH-MCNC: 2.7 GM/DL
GLUCOSE SERPL-MCNC: 148 MG/DL (ref 65–99)
POTASSIUM SERPL-SCNC: 4.3 MMOL/L (ref 3.5–5.2)
PROT SERPL-MCNC: 6.9 G/DL (ref 6–8.5)
SODIUM SERPL-SCNC: 136 MMOL/L (ref 136–145)

## 2022-03-31 ENCOUNTER — TELEPHONE (OUTPATIENT)
Dept: FAMILY MEDICINE CLINIC | Facility: CLINIC | Age: 52
End: 2022-03-31

## 2022-05-03 RX ORDER — LISINOPRIL 10 MG/1
TABLET ORAL
Qty: 90 TABLET | Refills: 3 | Status: SHIPPED | OUTPATIENT
Start: 2022-05-03 | End: 2023-03-13

## 2022-05-03 RX ORDER — ROSUVASTATIN CALCIUM 20 MG/1
TABLET, COATED ORAL
Qty: 90 TABLET | Refills: 3 | Status: SHIPPED | OUTPATIENT
Start: 2022-05-03 | End: 2023-03-06

## 2022-05-03 RX ORDER — INSULIN ASPART INJECTION 100 [IU]/ML
INJECTION, SOLUTION SUBCUTANEOUS
Qty: 75 ML | Refills: 5 | Status: SHIPPED | OUTPATIENT
Start: 2022-05-03 | End: 2022-10-27

## 2022-05-03 RX ORDER — INSULIN GLARGINE 300 U/ML
30 INJECTION, SOLUTION SUBCUTANEOUS 2 TIMES DAILY
Qty: 6 ML | Refills: 5 | Status: SHIPPED | OUTPATIENT
Start: 2022-05-03 | End: 2022-10-28

## 2022-06-01 RX ORDER — PEN NEEDLE, DIABETIC 31 GX5/16"
NEEDLE, DISPOSABLE MISCELLANEOUS
Qty: 400 EACH | Refills: 3 | Status: SHIPPED | OUTPATIENT
Start: 2022-06-01

## 2022-06-20 ENCOUNTER — OFFICE VISIT (OUTPATIENT)
Dept: FAMILY MEDICINE CLINIC | Facility: CLINIC | Age: 52
End: 2022-06-20

## 2022-06-20 ENCOUNTER — LAB (OUTPATIENT)
Dept: LAB | Facility: HOSPITAL | Age: 52
End: 2022-06-20

## 2022-06-20 VITALS
SYSTOLIC BLOOD PRESSURE: 132 MMHG | DIASTOLIC BLOOD PRESSURE: 64 MMHG | HEART RATE: 100 BPM | OXYGEN SATURATION: 96 % | WEIGHT: 262.6 LBS | BODY MASS INDEX: 39.8 KG/M2 | HEIGHT: 68 IN | TEMPERATURE: 97.8 F

## 2022-06-20 DIAGNOSIS — E78.5 HYPERLIPIDEMIA DUE TO TYPE 1 DIABETES MELLITUS: ICD-10-CM

## 2022-06-20 DIAGNOSIS — R60.0 LOCALIZED EDEMA: ICD-10-CM

## 2022-06-20 DIAGNOSIS — E10.65 TYPE 1 DIABETES MELLITUS WITH HYPERGLYCEMIA: ICD-10-CM

## 2022-06-20 DIAGNOSIS — E10.65 TYPE 1 DIABETES MELLITUS WITH HYPERGLYCEMIA: Primary | ICD-10-CM

## 2022-06-20 DIAGNOSIS — E10.69 HYPERLIPIDEMIA DUE TO TYPE 1 DIABETES MELLITUS: ICD-10-CM

## 2022-06-20 PROCEDURE — 36415 COLL VENOUS BLD VENIPUNCTURE: CPT

## 2022-06-20 PROCEDURE — 83036 HEMOGLOBIN GLYCOSYLATED A1C: CPT

## 2022-06-20 PROCEDURE — 99214 OFFICE O/P EST MOD 30 MIN: CPT | Performed by: FAMILY MEDICINE

## 2022-06-20 PROCEDURE — 80053 COMPREHEN METABOLIC PANEL: CPT

## 2022-06-20 RX ORDER — SPIRONOLACTONE 25 MG/1
25 TABLET ORAL DAILY
Qty: 30 TABLET | Refills: 2 | Status: SHIPPED | OUTPATIENT
Start: 2022-06-20 | End: 2022-08-02

## 2022-06-20 RX ORDER — FUROSEMIDE 40 MG/1
40 TABLET ORAL DAILY
Qty: 90 TABLET | Refills: 1 | Status: SHIPPED | OUTPATIENT
Start: 2022-06-20 | End: 2022-09-26 | Stop reason: SDUPTHER

## 2022-06-21 LAB
ALBUMIN SERPL-MCNC: 4 G/DL (ref 3.5–5.2)
ALBUMIN/GLOB SERPL: 1.4 G/DL
ALP SERPL-CCNC: 47 U/L (ref 39–117)
ALT SERPL W P-5'-P-CCNC: 28 U/L (ref 1–41)
ANION GAP SERPL CALCULATED.3IONS-SCNC: 13.2 MMOL/L (ref 5–15)
AST SERPL-CCNC: 23 U/L (ref 1–40)
BILIRUB SERPL-MCNC: <0.2 MG/DL (ref 0–1.2)
BUN SERPL-MCNC: 19 MG/DL (ref 6–20)
BUN/CREAT SERPL: 22.4 (ref 7–25)
CALCIUM SPEC-SCNC: 8.6 MG/DL (ref 8.6–10.5)
CHLORIDE SERPL-SCNC: 101 MMOL/L (ref 98–107)
CO2 SERPL-SCNC: 22.8 MMOL/L (ref 22–29)
CREAT SERPL-MCNC: 0.85 MG/DL (ref 0.76–1.27)
EGFRCR SERPLBLD CKD-EPI 2021: 103.9 ML/MIN/1.73
GLOBULIN UR ELPH-MCNC: 2.8 GM/DL
GLUCOSE SERPL-MCNC: 140 MG/DL (ref 65–99)
HBA1C MFR BLD: 6.6 % (ref 4.8–5.6)
POTASSIUM SERPL-SCNC: 4.5 MMOL/L (ref 3.5–5.2)
PROT SERPL-MCNC: 6.8 G/DL (ref 6–8.5)
SODIUM SERPL-SCNC: 137 MMOL/L (ref 136–145)

## 2022-07-05 ENCOUNTER — TELEPHONE (OUTPATIENT)
Dept: FAMILY MEDICINE CLINIC | Facility: CLINIC | Age: 52
End: 2022-07-05

## 2022-07-05 PROBLEM — E78.5 HYPERLIPIDEMIA DUE TO TYPE 1 DIABETES MELLITUS: Status: ACTIVE | Noted: 2022-07-05

## 2022-07-05 PROBLEM — E10.69 HYPERLIPIDEMIA DUE TO TYPE 1 DIABETES MELLITUS: Status: ACTIVE | Noted: 2022-07-05

## 2022-07-05 NOTE — TELEPHONE ENCOUNTER
----- Message from Magalis Malcolm MD sent at 7/3/2022  7:35 PM EDT -----  Labs stable. Okay to either call or send letter to patient. Thanks.    LETTER MAILED.

## 2022-08-02 ENCOUNTER — TELEPHONE (OUTPATIENT)
Dept: FAMILY MEDICINE CLINIC | Facility: CLINIC | Age: 52
End: 2022-08-02

## 2022-08-02 ENCOUNTER — PRIOR AUTHORIZATION (OUTPATIENT)
Dept: ENDOCRINOLOGY | Facility: CLINIC | Age: 52
End: 2022-08-02

## 2022-08-02 ENCOUNTER — TELEPHONE (OUTPATIENT)
Dept: ENDOCRINOLOGY | Facility: CLINIC | Age: 52
End: 2022-08-02

## 2022-08-02 NOTE — TELEPHONE ENCOUNTER
Caller: Mendez Deluna    Relationship: Self    Best call back number:[phone]    What was the call regarding:   PATIENT STATED THAT HE WOULD LIKE A CALL BACK FROM BHAKTI BLANCO MD ABOUT HIS MEDICATION   spironolactone (ALDACTONE) 25 MG tablet  AND HOW HE HAS BEEN DOING ON THE MEDICATION     Do you require a callback: YES

## 2022-08-02 NOTE — TELEPHONE ENCOUNTER
That's totally fine. Does he need a refill? Also, let him know that with daily use of lasix, he may have leg tingling/cramping or palpitations. This may mean that his potassium is low. So if that happens, I want him to call so we can check. Please schedule him an appointment in like 6 weeks or so so we can check regardless - because I rather catch that earlier rather than later.    No he has refills,verbalized understanding,FU scheduled.

## 2022-08-02 NOTE — TELEPHONE ENCOUNTER
That's totally fine. Does he need a refill? Also, let him know that with daily use of lasix, he may have leg tingling/cramping or palpitations. This may mean that his potassium is low. So if that happens, I want him to call so we can check. Please schedule him an appointment in like 6 weeks or so so we can check regardless - because I rather catch that earlier rather than later.

## 2022-08-02 NOTE — TELEPHONE ENCOUNTER
Did he take both together? Or separately? I'm okay if he just wants to stay on the lasix - whichever way he thinks has worked best for him.

## 2022-08-02 NOTE — TELEPHONE ENCOUNTER
Did he take both together? Or separately? I'm okay if he just wants to stay on the lasix - whichever way he thinks has worked best for him.    Left a message to return call.      Spoke with patient he reports he took them separately up until he was almost out then took them together did not make much difference,will just stay on the lasix.

## 2022-08-02 NOTE — TELEPHONE ENCOUNTER
Caller: Mendez Deluna    Relationship: Self    Best call back number:[phone]    What was the call regarding:   PATIENT STATED THAT HE WOULD LIKE A CALL BACK FROM BHAKTI BLANCO MD ABOUT HIS MEDICATION   spironolactone (ALDACTONE) 25 MG tablet  AND HOW HE HAS BEEN DOING ON THE MEDICATION     Do you require a callback: YES         Spoke with patient he reports he does not think the Spironolactone works as well as the Lasix,he is out of it now,is questioning if he needs both? Needs to just stay on the Lasix? Please advise.

## 2022-08-02 NOTE — TELEPHONE ENCOUNTER
PATIENT IS REQUESTING A RETURN CALL FROM CLINIC STAFF TO DISCUSS ISSUE WITH INSULIN RX. PLEASE RETURN CALL AT EARLIEST CONVENIENCE -[phone].

## 2022-08-02 NOTE — TELEPHONE ENCOUNTER
BLANCA PURI III (Herrera: [id number])  Rx #: [id number]  Fiasp FlexTouch 100UNIT/ML pen-injectors     Form  [employer] PA Form (2017 NCPDP)  Created  20 minutes ago  Sent to Plan  15 minutes ago  Plan Response  15 minutes ago  Submit Clinical Questions  11 minutes ago  Determination  Favorable  2 minutes ago  Message from Plan  PA Case: [id number], Status: Approved, Coverage Starts on: 8/2/2022 12:00:00 AM, Coverage Ends on: 8/2/2023 12:00:00 AM.

## 2022-09-07 ENCOUNTER — OFFICE VISIT (OUTPATIENT)
Dept: ENDOCRINOLOGY | Facility: CLINIC | Age: 52
End: 2022-09-07

## 2022-09-07 VITALS
DIASTOLIC BLOOD PRESSURE: 60 MMHG | BODY MASS INDEX: 40.16 KG/M2 | SYSTOLIC BLOOD PRESSURE: 120 MMHG | OXYGEN SATURATION: 98 % | WEIGHT: 265 LBS | HEIGHT: 68 IN | HEART RATE: 92 BPM

## 2022-09-07 DIAGNOSIS — E66.1 CLASS 3 DRUG-INDUCED OBESITY WITH SERIOUS COMORBIDITY AND BODY MASS INDEX (BMI) OF 40.0 TO 44.9 IN ADULT: Primary | ICD-10-CM

## 2022-09-07 DIAGNOSIS — E10.649 UNCONTROLLED TYPE 1 DIABETES MELLITUS WITH HYPOGLYCEMIA WITHOUT COMA: Primary | ICD-10-CM

## 2022-09-07 DIAGNOSIS — E66.01 CLASS 3 SEVERE OBESITY DUE TO EXCESS CALORIES WITH SERIOUS COMORBIDITY AND BODY MASS INDEX (BMI) OF 40.0 TO 44.9 IN ADULT: ICD-10-CM

## 2022-09-07 PROBLEM — E66.813 CLASS 3 SEVERE OBESITY DUE TO EXCESS CALORIES WITH SERIOUS COMORBIDITY AND BODY MASS INDEX (BMI) OF 40.0 TO 44.9 IN ADULT: Status: ACTIVE | Noted: 2022-09-07

## 2022-09-07 LAB
EXPIRATION DATE: ABNORMAL
GLUCOSE BLDC GLUCOMTR-MCNC: 68 MG/DL (ref 70–130)
Lab: ABNORMAL

## 2022-09-07 PROCEDURE — 82947 ASSAY GLUCOSE BLOOD QUANT: CPT | Performed by: INTERNAL MEDICINE

## 2022-09-07 PROCEDURE — 99214 OFFICE O/P EST MOD 30 MIN: CPT | Performed by: INTERNAL MEDICINE

## 2022-09-07 RX ORDER — TOPIRAMATE 50 MG/1
50 TABLET, FILM COATED ORAL 2 TIMES DAILY
Qty: 60 TABLET | Refills: 2 | Status: SHIPPED | OUTPATIENT
Start: 2022-09-07 | End: 2022-12-28

## 2022-09-07 RX ORDER — PHENTERMINE HYDROCHLORIDE 37.5 MG/1
18.75 TABLET ORAL
Qty: 16 TABLET | Refills: 3 | Status: SHIPPED | OUTPATIENT
Start: 2022-09-07

## 2022-09-07 NOTE — ASSESSMENT & PLAN NOTE
Patient's (Body mass index is 40.29 kg/m².) indicates that they are morbidly obese (BMI > 40 or > 35 with obesity - related health condition) with health conditions that include diabetes mellitus . Weight is newly identified. BMI is is above average; BMI management plan is completed. We discussed portion control and increasing exercise   Will add phen/top. Side effects addressed. .

## 2022-09-07 NOTE — PROGRESS NOTES
"     Office Note      Date: 2022  Patient Name: Mendez Deluna  MRN: [id number]  : 1970    Chief Complaint   Patient presents with   • Diabetes       History of Present Illness:   Mendez Deluna is a 51 y.o. male who presents for Diabetes - type 1  Using fiasp and toujeo     And a modified lan      Last A1c:  Hemoglobin A1C   Date Value Ref Range Status   2022 6.60 (H) 4.80 - 5.60 % Final       Changes in health since last visit: none . Last eye exam 2 weeks ago .    Subjective          Review of Systems:   Review of Systems   Constitutional: Negative.    HENT: Negative.    Eyes: Negative.    Respiratory: Negative.        The following portions of the patient's history were reviewed and updated as appropriate: allergies, current medications, past family history, past medical history, past social history, past surgical history and problem list.    Objective     Visit Vitals  /60   Pulse 92   Ht 172.7 cm (68\")   Wt 120 kg (265 lb)   SpO2 98%   BMI 40.29 kg/m²       Labs:    CMP  Lab Results   Component Value Date    GLUCOSE 140 (H) 2022    BUN 19 2022    CREATININE 0.85 2022    EGFRIFNONA 131 2021    BCR 22.4 2022    K 4.5 2022    CO2 22.8 2022    CALCIUM 8.6 2022    AST 23 2022    ALT 28 2022        CBC w/DIFF  Lab Results   Component Value Date    WBC 7.11 2021    RBC 5.18 2021    HGB 15.5 2021    HCT 46.1 2021    MCV 89.0 2021    MCH 29.9 2021    MCHC 33.6 2021    RDW 12.7 2021    RDWSD 41.9 2021    MPV 12.4 (H) 2021     2021    NEUTRORELPCT 72.1 2021    LYMPHORELPCT 16.9 (L) 2021    MONORELPCT 9.4 2021    EOSRELPCT 1.0 2021    BASORELPCT 0.3 2021    AUTOIGPER 0.3 2021    NEUTROABS 5.13 2021    LYMPHSABS 1.20 2021    MONOSABS 0.67 2021    EOSABS 0.07 2021    BASOSABS 0.02 2021    AUTOIGNUM 0.02 " 07/20/2021    NRBC 0.0 07/20/2021       Physical Exam:  Physical Exam  Vitals reviewed.   Constitutional:       Appearance: Normal appearance.   Neurological:      Mental Status: He is alert.   Psychiatric:         Mood and Affect: Mood normal.         Behavior: Behavior normal.         Thought Content: Thought content normal.         Judgment: Judgment normal.          Assessment / Plan      Assessment & Plan:  Problem List Items Addressed This Visit        Other    Uncontrolled type 1 diabetes mellitus with hypoglycemia without coma (HCC) - Primary    Overview     Since age 11         Current Assessment & Plan     a1c at goal of 6.7  He has not had severe lows and his monitoring is up to date  No changes are needed         Relevant Medications    Fiasp FlexTouch 100 UNIT/ML solution pen-injector    Toujeo Max SoloStar 300 UNIT/ML solution pen-injector injection    Other Relevant Orders    POC Glucose, Blood (Completed)    Class 3 severe obesity due to excess calories with serious comorbidity and body mass index (BMI) of 40.0 to 44.9 in adult ([facility])    Current Assessment & Plan     Patient's (Body mass index is 40.29 kg/m².) indicates that they are morbidly obese (BMI > 40 or > 35 with obesity - related health condition) with health conditions that include diabetes mellitus . Weight is newly identified. BMI is is above average; BMI management plan is completed. We discussed portion control and increasing exercise   Will add phen/top. Side effects addressed. .                 Everardo Troncoso MD   09/07/2022

## 2022-09-07 NOTE — ASSESSMENT & PLAN NOTE
a1c at goal of 6.7  He has not had severe lows and his monitoring is up to date  No changes are needed

## 2022-09-26 ENCOUNTER — OFFICE VISIT (OUTPATIENT)
Dept: FAMILY MEDICINE CLINIC | Facility: CLINIC | Age: 52
End: 2022-09-26

## 2022-09-26 VITALS
DIASTOLIC BLOOD PRESSURE: 86 MMHG | HEART RATE: 107 BPM | HEIGHT: 68 IN | BODY MASS INDEX: 40.16 KG/M2 | WEIGHT: 265 LBS | SYSTOLIC BLOOD PRESSURE: 126 MMHG | OXYGEN SATURATION: 98 % | TEMPERATURE: 97.3 F

## 2022-09-26 DIAGNOSIS — M54.50 CHRONIC BILATERAL LOW BACK PAIN WITHOUT SCIATICA: ICD-10-CM

## 2022-09-26 DIAGNOSIS — K21.9 GASTROESOPHAGEAL REFLUX DISEASE WITHOUT ESOPHAGITIS: ICD-10-CM

## 2022-09-26 DIAGNOSIS — E78.5 HYPERLIPIDEMIA DUE TO TYPE 1 DIABETES MELLITUS: ICD-10-CM

## 2022-09-26 DIAGNOSIS — G89.29 CHRONIC BILATERAL LOW BACK PAIN WITH SCIATICA, SCIATICA LATERALITY UNSPECIFIED: ICD-10-CM

## 2022-09-26 DIAGNOSIS — E10.69 HYPERLIPIDEMIA DUE TO TYPE 1 DIABETES MELLITUS: ICD-10-CM

## 2022-09-26 DIAGNOSIS — G89.29 CHRONIC BILATERAL LOW BACK PAIN WITHOUT SCIATICA: ICD-10-CM

## 2022-09-26 DIAGNOSIS — M54.40 CHRONIC BILATERAL LOW BACK PAIN WITH SCIATICA, SCIATICA LATERALITY UNSPECIFIED: ICD-10-CM

## 2022-09-26 DIAGNOSIS — R00.2 PALPITATIONS: ICD-10-CM

## 2022-09-26 DIAGNOSIS — E10.65 TYPE 1 DIABETES MELLITUS WITH HYPERGLYCEMIA: Primary | ICD-10-CM

## 2022-09-26 DIAGNOSIS — R53.83 FATIGUE, UNSPECIFIED TYPE: ICD-10-CM

## 2022-09-26 DIAGNOSIS — R60.0 LOCALIZED EDEMA: ICD-10-CM

## 2022-09-26 PROCEDURE — 99214 OFFICE O/P EST MOD 30 MIN: CPT | Performed by: FAMILY MEDICINE

## 2022-09-26 PROCEDURE — 93000 ELECTROCARDIOGRAM COMPLETE: CPT | Performed by: FAMILY MEDICINE

## 2022-09-26 RX ORDER — PANTOPRAZOLE SODIUM 40 MG/1
40 TABLET, DELAYED RELEASE ORAL DAILY
Qty: 30 TABLET | Refills: 2 | Status: SHIPPED | OUTPATIENT
Start: 2022-09-26 | End: 2022-12-28

## 2022-09-26 RX ORDER — FUROSEMIDE 40 MG/1
40 TABLET ORAL DAILY
Qty: 90 TABLET | Refills: 1 | Status: SHIPPED | OUTPATIENT
Start: 2022-09-26 | End: 2023-03-31

## 2022-09-29 DIAGNOSIS — R60.0 LOCALIZED EDEMA: ICD-10-CM

## 2022-09-30 RX ORDER — SPIRONOLACTONE 25 MG/1
25 TABLET ORAL DAILY
Qty: 30 TABLET | Refills: 2 | OUTPATIENT
Start: 2022-09-30

## 2022-10-06 ENCOUNTER — OFFICE VISIT (OUTPATIENT)
Dept: FAMILY MEDICINE CLINIC | Facility: CLINIC | Age: 52
End: 2022-10-06

## 2022-10-06 DIAGNOSIS — Z20.828 EXPOSURE TO THE FLU: ICD-10-CM

## 2022-10-06 DIAGNOSIS — R53.83 OTHER FATIGUE: Primary | ICD-10-CM

## 2022-10-06 DIAGNOSIS — R05.9 COUGH, UNSPECIFIED TYPE: ICD-10-CM

## 2022-10-06 LAB
EXPIRATION DATE: NORMAL
EXPIRATION DATE: NORMAL
FLUAV AG NPH QL: NEGATIVE
FLUBV AG NPH QL: NEGATIVE
INTERNAL CONTROL: NORMAL
INTERNAL CONTROL: NORMAL
Lab: NORMAL
Lab: NORMAL
SARS-COV-2 AG UPPER RESP QL IA.RAPID: NOT DETECTED

## 2022-10-06 PROCEDURE — 87804 INFLUENZA ASSAY W/OPTIC: CPT | Performed by: FAMILY MEDICINE

## 2022-10-06 PROCEDURE — U0004 COV-19 TEST NON-CDC HGH THRU: HCPCS | Performed by: FAMILY MEDICINE

## 2022-10-06 PROCEDURE — 87426 SARSCOV CORONAVIRUS AG IA: CPT | Performed by: FAMILY MEDICINE

## 2022-10-06 PROCEDURE — 99213 OFFICE O/P EST LOW 20 MIN: CPT | Performed by: FAMILY MEDICINE

## 2022-10-06 RX ORDER — PREDNISONE 10 MG/1
TABLET ORAL
Qty: 14 TABLET | Refills: 0 | Status: SHIPPED | OUTPATIENT
Start: 2022-10-06

## 2022-10-06 RX ORDER — AMOXICILLIN AND CLAVULANATE POTASSIUM 875; 125 MG/1; MG/1
1 TABLET, FILM COATED ORAL EVERY 12 HOURS SCHEDULED
Qty: 14 TABLET | Refills: 0 | Status: SHIPPED | OUTPATIENT
Start: 2022-10-06

## 2022-10-07 LAB — SARS-COV-2 RNA PNL SPEC NAA+PROBE: NOT DETECTED

## 2022-10-10 ENCOUNTER — TELEPHONE (OUTPATIENT)
Dept: FAMILY MEDICINE CLINIC | Facility: CLINIC | Age: 52
End: 2022-10-10

## 2022-10-10 NOTE — TELEPHONE ENCOUNTER
Left voicemail to return call in regards to overdue lab and xray orders.     Attempted to reach again by phone to no avail.    Letter mailed.

## 2022-10-17 ENCOUNTER — TELEPHONE (OUTPATIENT)
Dept: ENDOCRINOLOGY | Facility: CLINIC | Age: 52
End: 2022-10-17

## 2022-10-17 RX ORDER — BLOOD-GLUCOSE SENSOR
1 EACH MISCELLANEOUS
Qty: 6 EACH | Refills: 3 | Status: SHIPPED | OUTPATIENT
Start: 2022-10-17

## 2022-10-17 NOTE — TELEPHONE ENCOUNTER
----- Message from Mendez Deluna sent at 10/15/2022  1:41 PM EDT -----  Regarding: Freestyle Ronnie 3  Contact: [phone]  I understand the Freestyle Ronnie 3 is now available in the [place], and I was wondering if I could get a prescription for it sent to the [facility] where my 14-Day Libres prescription has been sent ([address] [place] [address] E, [place], [place] location).

## 2022-10-19 RX ORDER — FLASH GLUCOSE SCANNING READER
EACH MISCELLANEOUS
Qty: 1 EACH | Refills: 0 | Status: SHIPPED | OUTPATIENT
Start: 2022-10-19

## 2022-10-24 NOTE — PROGRESS NOTES
Mendez Deluna     VITALS: Temperature 98.9, heart rate 103, respiratory rate 18    Subjective  Chief Complaint  Cough, Fatigue, and Shortness of Breath    Subjective          History of Present Illness:  Patient is a 51 y.o.  male with medical conditions significant for hyperlipidemia, GERD, and hypertension who presents to clinic secondary to an acute concern.  He is not feeling well.  Has not felt well for 5 days.  Patient complains of a nonproductive cough, fatigue, shortness of breath.  He denies any fevers.  He denies any congestion.  No rhinorrhea.  His work colleagues have been sick with flu.  No exposure to COVID as far as he knows.    No complaints about any of the medications.    The following portions of the patient's history were reviewed and updated as appropriate: allergies, current medications, past family history, past medical history, past social history, past surgical history and problem list.    Past Medical History  Past Medical History:   Diagnosis Date   • Acquired trigger finger    • Back pain    • Chest pain    • Hypercholesterolemia    • Type 1 diabetes mellitus (HCC)        Surgical History  Past Surgical History:   Procedure Laterality Date   • WISDOM TOOTH EXTRACTION         Family History  Family History   Problem Relation Age of Onset   • Cancer Mother    • Hyperlipidemia Mother    • Thyroid cancer Mother    • Thyroid disease Mother    • Diabetes Father    • Hyperlipidemia Father    • Heart disease Father         Quad bypass       Social History  Social History     Socioeconomic History   • Marital status:    Tobacco Use   • Smoking status: Never   • Smokeless tobacco: Never   Vaping Use   • Vaping Use: Never used   Substance and Sexual Activity   • Alcohol use: Yes     Comment: Not weekly but on occasion   • Drug use: Never   • Sexual activity: Defer       Objective   Vital Signs:   There were no vitals taken for this visit.    Physical Exam     Gen: Patient in NAD. Pleasant and  answers appropriately. A&Ox3.    Skin: Warm and dry with normal turgor. No purpura, rashes, or unusual pigmentation noted. Hair is normal in appearance and distribution.    HEENT: NC/AT. No lesions noted. Conjunctiva clear, sclera nonicteric. PERRL. EOMI without nystagmus or strabismus. Fundi appear benign. No hemorrhages or exudates of eyes. Auditory canals are patent bilaterally without lesions. TMs intact,  nonerythematous, nonbulging without lesions. Nasal mucosa erythematous, and nonedematous. Frontal and maxillary sinuses are nontender. O/P erythematous and moist without exudate.    Neck: Supple without lymph nodes palpated. FROM.     Lungs: Slightly decreased B/L without rales, rhonchi, crackles, or wheezes.    Heart: RRR. S1 and S2 normal. No S3 or S4. No MRGT.    Abd: Soft, nontender,nondistended. (+)BSx4 quadrants.     Procedures           Assessment and Plan    Mendez Deluna is a 51 y.o. here for an acute concern.    Problem List Items Addressed This Visit    None  Visit Diagnoses     Other fatigue    -  Primary    Relevant Orders    POCT Influenza A/B (Completed)    POCT SARS-CoV-2 Antigen EZE (Completed)    COVID-19, APTIMA PANTHER CORNELIUS IN-HOUSE NP/OP SWAB IN UTM/VTM/SALINE TRANSPORT MEDIA 24HR TAT - Swab, Nasal Cavity (Completed)    Exposure to the flu        Relevant Orders    POCT Influenza A/B (Completed)    POCT SARS-CoV-2 Antigen EZE (Completed)    COVID-19, APTIMA PANTHER CORNELIUS IN-HOUSE NP/OP SWAB IN UTM/VTM/SALINE TRANSPORT MEDIA 24HR TAT - Swab, Nasal Cavity (Completed)    Cough, unspecified type        Relevant Orders    POCT Influenza A/B (Completed)    POCT SARS-CoV-2 Antigen EZE (Completed)    COVID-19, APTIMA PANTHER CORNELIUS IN-HOUSE NP/OP SWAB IN UTM/VTM/SALINE TRANSPORT MEDIA 24HR TAT - Swab, Nasal Cavity (Completed)        Supportive care indicated, including increased fluids and rest. Patient to monitor. Patient to call if symptoms continue or worsen.       Class 3 Severe Obesity (BMI >=40).  Obesity-related health conditions include the following: hypertension. Obesity is unchanged. BMI is is above average; BMI management plan is completed. We discussed portion control and increasing exercise.                   Follow Up   Return in about 3 months (around 1/6/2023).  Findings and plans discussed with patient who verbalizes understanding and agreement. Will followup with patient once results are in. Patient was given instructions and counseling regarding his condition or for health maintenance advice. Please see specific information pulled into the AVS if appropriate.       Magalis Malcolm MD

## 2022-10-27 RX ORDER — INSULIN ASPART INJECTION 100 [IU]/ML
INJECTION, SOLUTION SUBCUTANEOUS
Qty: 75 ML | Refills: 5 | Status: SHIPPED | OUTPATIENT
Start: 2022-10-27

## 2022-10-28 RX ORDER — INSULIN GLARGINE 300 U/ML
30 INJECTION, SOLUTION SUBCUTANEOUS 2 TIMES DAILY
Qty: 6 ML | Refills: 4 | Status: SHIPPED | OUTPATIENT
Start: 2022-10-28 | End: 2023-04-03

## 2022-10-28 RX ORDER — INSULIN GLARGINE 300 U/ML
30 INJECTION, SOLUTION SUBCUTANEOUS 2 TIMES DAILY
Qty: 6 ML | Refills: 5 | Status: CANCELLED | OUTPATIENT
Start: 2022-10-28

## 2022-12-28 DIAGNOSIS — E66.1 CLASS 3 DRUG-INDUCED OBESITY WITH SERIOUS COMORBIDITY AND BODY MASS INDEX (BMI) OF 40.0 TO 44.9 IN ADULT: ICD-10-CM

## 2022-12-28 DIAGNOSIS — K21.9 GASTROESOPHAGEAL REFLUX DISEASE WITHOUT ESOPHAGITIS: ICD-10-CM

## 2022-12-28 RX ORDER — PANTOPRAZOLE SODIUM 40 MG/1
40 TABLET, DELAYED RELEASE ORAL DAILY
Qty: 30 TABLET | Refills: 2 | Status: SHIPPED | OUTPATIENT
Start: 2022-12-28 | End: 2023-03-28

## 2022-12-29 RX ORDER — TOPIRAMATE 50 MG/1
50 TABLET, FILM COATED ORAL 2 TIMES DAILY
Qty: 90 TABLET | Refills: 1 | Status: SHIPPED | OUTPATIENT
Start: 2022-12-29

## 2023-01-17 DIAGNOSIS — R60.0 LOCALIZED EDEMA: ICD-10-CM

## 2023-01-17 RX ORDER — FUROSEMIDE 40 MG/1
40 TABLET ORAL DAILY
Qty: 90 TABLET | Refills: 1 | OUTPATIENT
Start: 2023-01-17

## 2023-03-06 RX ORDER — ROSUVASTATIN CALCIUM 20 MG/1
TABLET, COATED ORAL
Qty: 90 TABLET | Refills: 1 | Status: SHIPPED | OUTPATIENT
Start: 2023-03-06

## 2023-03-09 RX ORDER — SPIRONOLACTONE 25 MG/1
TABLET ORAL
Qty: 30 TABLET | OUTPATIENT
Start: 2023-03-09

## 2023-03-13 RX ORDER — LISINOPRIL 10 MG/1
TABLET ORAL
Qty: 90 TABLET | Refills: 1 | Status: SHIPPED | OUTPATIENT
Start: 2023-03-13

## 2023-03-28 DIAGNOSIS — K21.9 GASTROESOPHAGEAL REFLUX DISEASE WITHOUT ESOPHAGITIS: ICD-10-CM

## 2023-03-28 RX ORDER — PANTOPRAZOLE SODIUM 40 MG/1
40 TABLET, DELAYED RELEASE ORAL DAILY
Qty: 30 TABLET | Refills: 0 | Status: SHIPPED | OUTPATIENT
Start: 2023-03-28

## 2023-03-31 DIAGNOSIS — R60.0 LOCALIZED EDEMA: ICD-10-CM

## 2023-03-31 RX ORDER — FUROSEMIDE 40 MG/1
TABLET ORAL
Qty: 90 TABLET | Refills: 1 | Status: SHIPPED | OUTPATIENT
Start: 2023-03-31

## 2023-04-01 DIAGNOSIS — K21.9 GASTROESOPHAGEAL REFLUX DISEASE WITHOUT ESOPHAGITIS: ICD-10-CM

## 2023-04-03 RX ORDER — PANTOPRAZOLE SODIUM 40 MG/1
40 TABLET, DELAYED RELEASE ORAL DAILY
Qty: 30 TABLET | Refills: 0 | OUTPATIENT
Start: 2023-04-03

## 2023-04-03 RX ORDER — INSULIN GLARGINE 300 U/ML
INJECTION, SOLUTION SUBCUTANEOUS
Qty: 6 ML | Refills: 4 | Status: SHIPPED | OUTPATIENT
Start: 2023-04-03

## 2023-04-11 ENCOUNTER — TELEPHONE (OUTPATIENT)
Dept: ENDOCRINOLOGY | Facility: CLINIC | Age: 53
End: 2023-04-11
Payer: MEDICAID

## 2023-04-11 ENCOUNTER — PRIOR AUTHORIZATION (OUTPATIENT)
Dept: ENDOCRINOLOGY | Facility: CLINIC | Age: 53
End: 2023-04-11
Payer: MEDICAID

## 2023-04-11 NOTE — TELEPHONE ENCOUNTER
Mendez Deluna iii Key: [id number] - PA Case ID: [id number] - Rx #: [id number] help? Call us at [phone]  Outcome  Approvedtoday  The request has been approved. The authorization is effective for a maximum of 12 fills from 04/11/2023 to 04/10/2024, as long as the member is enrolled in their current health plan. The request was approved as submitted. A written notification letter will follow with additional details.  Drug  Toujeo Max SoloStar 300UNIT/ML pen-injectors  Form  [facility] [place] [facility] ePA Form 2017 NCPDP

## 2023-04-11 NOTE — TELEPHONE ENCOUNTER
PT CALLED STATING TOUJEO REQUIRES A PA. I UPDATED HIS INSURANCE. HE IS COVERED BY [facility] MEDICAID NOW. HE STATED HE IS ALMOST OUT OF MEDS.

## 2023-04-18 ENCOUNTER — OFFICE VISIT (OUTPATIENT)
Dept: ENDOCRINOLOGY | Facility: CLINIC | Age: 53
End: 2023-04-18
Payer: MEDICAID

## 2023-04-18 VITALS
WEIGHT: 257 LBS | HEART RATE: 85 BPM | DIASTOLIC BLOOD PRESSURE: 70 MMHG | SYSTOLIC BLOOD PRESSURE: 120 MMHG | OXYGEN SATURATION: 96 % | HEIGHT: 68 IN | BODY MASS INDEX: 38.95 KG/M2

## 2023-04-18 DIAGNOSIS — E10.649 UNCONTROLLED TYPE 1 DIABETES MELLITUS WITH HYPOGLYCEMIA WITHOUT COMA: ICD-10-CM

## 2023-04-18 DIAGNOSIS — E10.649 TYPE 1 DIABETES MELLITUS WITH HYPOGLYCEMIA UNAWARENESS: Primary | ICD-10-CM

## 2023-04-18 LAB
ALBUMIN SERPL-MCNC: 4.7 G/DL (ref 3.5–5.2)
ALBUMIN UR-MCNC: <1.2 MG/DL
ALBUMIN/GLOB SERPL: 2.1 G/DL
ALP SERPL-CCNC: 59 U/L (ref 39–117)
ALT SERPL W P-5'-P-CCNC: 20 U/L (ref 1–41)
ANION GAP SERPL CALCULATED.3IONS-SCNC: 14.9 MMOL/L (ref 5–15)
AST SERPL-CCNC: 21 U/L (ref 1–40)
BILIRUB SERPL-MCNC: 0.4 MG/DL (ref 0–1.2)
BUN SERPL-MCNC: 14 MG/DL (ref 6–20)
BUN/CREAT SERPL: 18.2 (ref 7–25)
CALCIUM SPEC-SCNC: 8.8 MG/DL (ref 8.6–10.5)
CHLORIDE SERPL-SCNC: 102 MMOL/L (ref 98–107)
CHOLEST SERPL-MCNC: 192 MG/DL (ref 0–200)
CO2 SERPL-SCNC: 25.1 MMOL/L (ref 22–29)
CREAT SERPL-MCNC: 0.77 MG/DL (ref 0.76–1.27)
CREAT UR-MCNC: 74.8 MG/DL
EGFRCR SERPLBLD CKD-EPI 2021: 106.4 ML/MIN/1.73
EXPIRATION DATE: NORMAL
EXPIRATION DATE: NORMAL
GLOBULIN UR ELPH-MCNC: 2.2 GM/DL
GLUCOSE BLDC GLUCOMTR-MCNC: 70 MG/DL (ref 70–130)
GLUCOSE SERPL-MCNC: 113 MG/DL (ref 65–99)
HBA1C MFR BLD: 6.7 %
HDLC SERPL-MCNC: 42 MG/DL (ref 40–60)
LDLC SERPL CALC-MCNC: 132 MG/DL (ref 0–100)
LDLC/HDLC SERPL: 3.11 {RATIO}
Lab: NORMAL
Lab: NORMAL
MICROALBUMIN/CREAT UR: NORMAL MG/G{CREAT}
POTASSIUM SERPL-SCNC: 4.2 MMOL/L (ref 3.5–5.2)
PROT SERPL-MCNC: 6.9 G/DL (ref 6–8.5)
SODIUM SERPL-SCNC: 142 MMOL/L (ref 136–145)
TRIGL SERPL-MCNC: 96 MG/DL (ref 0–150)
VLDLC SERPL-MCNC: 18 MG/DL (ref 5–40)

## 2023-04-18 PROCEDURE — 80053 COMPREHEN METABOLIC PANEL: CPT | Performed by: INTERNAL MEDICINE

## 2023-04-18 PROCEDURE — 82947 ASSAY GLUCOSE BLOOD QUANT: CPT | Performed by: INTERNAL MEDICINE

## 2023-04-18 PROCEDURE — 36415 COLL VENOUS BLD VENIPUNCTURE: CPT | Performed by: INTERNAL MEDICINE

## 2023-04-18 PROCEDURE — 82570 ASSAY OF URINE CREATININE: CPT | Performed by: INTERNAL MEDICINE

## 2023-04-18 PROCEDURE — 80061 LIPID PANEL: CPT | Performed by: INTERNAL MEDICINE

## 2023-04-18 PROCEDURE — 3044F HG A1C LEVEL LT 7.0%: CPT | Performed by: INTERNAL MEDICINE

## 2023-04-18 PROCEDURE — 84443 ASSAY THYROID STIM HORMONE: CPT | Performed by: INTERNAL MEDICINE

## 2023-04-18 PROCEDURE — 1160F RVW MEDS BY RX/DR IN RCRD: CPT | Performed by: INTERNAL MEDICINE

## 2023-04-18 PROCEDURE — 1159F MED LIST DOCD IN RCRD: CPT | Performed by: INTERNAL MEDICINE

## 2023-04-18 PROCEDURE — 99213 OFFICE O/P EST LOW 20 MIN: CPT | Performed by: INTERNAL MEDICINE

## 2023-04-18 PROCEDURE — 82043 UR ALBUMIN QUANTITATIVE: CPT | Performed by: INTERNAL MEDICINE

## 2023-04-18 PROCEDURE — 83036 HEMOGLOBIN GLYCOSYLATED A1C: CPT | Performed by: INTERNAL MEDICINE

## 2023-04-18 RX ORDER — PROCHLORPERAZINE 25 MG/1
SUPPOSITORY RECTAL
Qty: 9 EACH | Refills: 3 | Status: SHIPPED | OUTPATIENT
Start: 2023-04-18

## 2023-04-18 RX ORDER — PROCHLORPERAZINE 25 MG/1
1 SUPPOSITORY RECTAL
Qty: 1 EACH | Refills: 3 | Status: SHIPPED | OUTPATIENT
Start: 2023-04-18

## 2023-04-18 RX ORDER — INSULIN PMP CART,AUT,G6/7,CNTR
1 EACH SUBCUTANEOUS TAKE AS DIRECTED
Qty: 1 KIT | Refills: 0 | Status: SHIPPED | OUTPATIENT
Start: 2023-04-18

## 2023-04-18 NOTE — ASSESSMENT & PLAN NOTE
Stable with a1c still at goal  Due for fastinbg labs   Too many lows so will transition to omnipod 5

## 2023-04-18 NOTE — PROGRESS NOTES
"     Office Note      Date: 2023  Patient Name: Mendez Deluna  MRN: [id number]  : 1970    Chief Complaint   Patient presents with   • Diabetes     Type I       History of Present Illness:   Mendez Deluna is a 52 y.o. male who presents for Diabetes type 1.   Current RX type 1  Rx: lan 3/ 4 shots per day     Bg checks are done:>10 times per day   Hypoglycemia :minimal      Last A1c:  Hemoglobin A1C   Date Value Ref Range Status   2023 6.7 % Final   2022 6.60 (H) 4.80 - 5.60 % Final       Changes in health since last visit: wants to change to dexcom op5 . Last eye exam up to date.    Subjective            Review of Systems:   Review of Systems   Constitutional: Negative.    HENT: Negative.    Eyes: Negative.    Respiratory: Negative.        The following portions of the patient's history were reviewed and updated as appropriate: allergies, current medications, past family history, past medical history, past social history, past surgical history and problem list.    Objective     Visit Vitals  /70 (BP Location: Left arm, Patient Position: Sitting, Cuff Size: Adult)   Pulse 85   Ht 172.7 cm (68\")   Wt 117 kg (257 lb)   SpO2 96%   BMI 39.08 kg/m²           Physical Exam:  Physical Exam  Vitals reviewed.   Constitutional:       Appearance: Normal appearance.   Neurological:      Mental Status: He is alert.   Psychiatric:         Mood and Affect: Mood normal.         Behavior: Behavior normal.         Thought Content: Thought content normal.         Judgment: Judgment normal.          Assessment / Plan      Assessment & Plan:  Problem List Items Addressed This Visit        Other    Uncontrolled type 1 diabetes mellitus with hypoglycemia without coma (HCC) - Primary    Overview     Since age 11         Current Assessment & Plan     Stable with a1c still at goal  Due for fastinbg labs   Too many lows so will transition to omnipod 5          Relevant Medications    Fiasp FlexTouch 100 UNIT/ML " solution pen-injector    Sherrie Ag SoloStar 300 UNIT/ML solution pen-injector injection    Other Relevant Orders    POC Glucose, Blood (Completed)    POC Glycosylated Hemoglobin (Hb A1C) (Completed)    Comprehensive Metabolic Panel    Lipid Panel    Microalbumin / Creatinine Urine Ratio - Urine, Clean Catch    TSH        Everardo Troncoso MD   04/18/2023

## 2023-04-19 LAB — TSH SERPL DL<=0.05 MIU/L-ACNC: 1.39 UIU/ML (ref 0.27–4.2)

## 2023-04-19 RX ORDER — ROSUVASTATIN CALCIUM 40 MG/1
40 TABLET, COATED ORAL DAILY
Qty: 30 TABLET | Refills: 11 | Status: SHIPPED | OUTPATIENT
Start: 2023-04-19 | End: 2024-04-18

## 2023-05-30 RX ORDER — INSULIN PMP CART,AUT,G6/7,CNTR
EACH SUBCUTANEOUS
OUTPATIENT
Start: 2023-05-30

## 2023-05-30 NOTE — TELEPHONE ENCOUNTER
Rx Refill Note  Requested Prescriptions     Pending Prescriptions Disp Refills   • Insulin Disposable Pump (Omnipod 5 G6 Intro, Gen 5,) kit [Pharmacy Med Name: OMNIPOD 5 G6 INTRO KIT (GEN 5)]       Sig: USE AS DIRECTED EVERY DAY          Last office visit with prescribing clinician: 4/18/2023     Next office visit with prescribing clinician: 10/23/2023         Kalli Giordano MA  05/30/23, 16:17 EDT

## 2023-06-05 RX ORDER — INSULIN ASPART INJECTION 100 [IU]/ML
INJECTION, SOLUTION SUBCUTANEOUS
Qty: 204 ML | Refills: 1 | OUTPATIENT
Start: 2023-06-05

## 2023-06-16 ENCOUNTER — TELEPHONE (OUTPATIENT)
Dept: ENDOCRINOLOGY | Facility: CLINIC | Age: 53
End: 2023-06-16
Payer: MEDICAID

## 2023-06-16 RX ORDER — INSULIN PMP CART,AUT,G6/7,CNTR
1 EACH SUBCUTANEOUS EVERY OTHER DAY
Qty: 45 EACH | Refills: 3 | Status: SHIPPED | OUTPATIENT
Start: 2023-06-16

## 2023-06-16 NOTE — TELEPHONE ENCOUNTER
----- Message from Mendez Deluna sent at 6/16/2023  2:09 PM EDT -----  Regarding: Pumps Low  Contact: [phone]  Hi Dr. Troncoso, I have only three Omnipods left. They are lasting me two days. So, can we try a script for an Omnipod for every two days and see if insurance will cover it? Or, what are your thoughts?

## 2023-07-21 DIAGNOSIS — R60.0 LOCALIZED EDEMA: ICD-10-CM

## 2023-07-24 RX ORDER — LISINOPRIL 10 MG/1
10 TABLET ORAL DAILY
Qty: 90 TABLET | Refills: 1 | Status: SHIPPED | OUTPATIENT
Start: 2023-07-24 | End: 2023-07-28 | Stop reason: SDUPTHER

## 2023-07-24 RX ORDER — FUROSEMIDE 40 MG/1
40 TABLET ORAL DAILY
Qty: 90 TABLET | Refills: 1 | OUTPATIENT
Start: 2023-07-24

## 2023-07-25 RX ORDER — INSULIN LISPRO-AABC 200 [IU]/ML
300 INJECTION, SOLUTION SUBCUTANEOUS DAILY
Qty: 270 ML | Refills: 1 | Status: SHIPPED | OUTPATIENT
Start: 2023-07-25

## 2023-07-25 NOTE — TELEPHONE ENCOUNTER
----- Message from Mendez Deluna sent at 7/24/2023 10:41 PM EDT -----  Regarding: Insulin  Contact: [phone]  Sorry; by the time this is read it should be “this morning (Tuesday, 25 July),” not “tomorrow morning.” Thanks.

## 2023-07-25 NOTE — TELEPHONE ENCOUNTER
Rx Refill Note  Requested Prescriptions      No prescriptions requested or ordered in this encounter        Last office visit with prescribing clinician: 4/18/2023      Next office visit with prescribing clinician: 10/23/2023       Randi Raya (Jodi)  07/25/23, 10:05 EDT

## 2023-07-28 ENCOUNTER — OFFICE VISIT (OUTPATIENT)
Dept: FAMILY MEDICINE CLINIC | Facility: CLINIC | Age: 53
End: 2023-07-28
Payer: MEDICAID

## 2023-07-28 VITALS
SYSTOLIC BLOOD PRESSURE: 126 MMHG | RESPIRATION RATE: 16 BRPM | HEIGHT: 68 IN | OXYGEN SATURATION: 96 % | BODY MASS INDEX: 39.92 KG/M2 | TEMPERATURE: 98.4 F | DIASTOLIC BLOOD PRESSURE: 78 MMHG | HEART RATE: 90 BPM | WEIGHT: 263.4 LBS

## 2023-07-28 DIAGNOSIS — G89.29 CHRONIC BILATERAL LOW BACK PAIN WITH SCIATICA, SCIATICA LATERALITY UNSPECIFIED: ICD-10-CM

## 2023-07-28 DIAGNOSIS — M79.602 BILATERAL ARM PAIN: ICD-10-CM

## 2023-07-28 DIAGNOSIS — E10.65 TYPE 1 DIABETES MELLITUS WITH HYPERGLYCEMIA: ICD-10-CM

## 2023-07-28 DIAGNOSIS — K21.9 GASTROESOPHAGEAL REFLUX DISEASE WITHOUT ESOPHAGITIS: ICD-10-CM

## 2023-07-28 DIAGNOSIS — E11.59 HYPERTENSION ASSOCIATED WITH DIABETES: Primary | ICD-10-CM

## 2023-07-28 DIAGNOSIS — Z12.5 ENCOUNTER FOR SCREENING FOR MALIGNANT NEOPLASM OF PROSTATE: ICD-10-CM

## 2023-07-28 DIAGNOSIS — E78.5 HYPERLIPIDEMIA DUE TO TYPE 1 DIABETES MELLITUS: ICD-10-CM

## 2023-07-28 DIAGNOSIS — R53.83 FATIGUE, UNSPECIFIED TYPE: ICD-10-CM

## 2023-07-28 DIAGNOSIS — I15.2 HYPERTENSION ASSOCIATED WITH DIABETES: Primary | ICD-10-CM

## 2023-07-28 DIAGNOSIS — Z12.11 ENCOUNTER FOR SCREENING COLONOSCOPY: ICD-10-CM

## 2023-07-28 DIAGNOSIS — M54.40 CHRONIC BILATERAL LOW BACK PAIN WITH SCIATICA, SCIATICA LATERALITY UNSPECIFIED: ICD-10-CM

## 2023-07-28 DIAGNOSIS — M79.601 BILATERAL ARM PAIN: ICD-10-CM

## 2023-07-28 DIAGNOSIS — E10.69 HYPERLIPIDEMIA DUE TO TYPE 1 DIABETES MELLITUS: ICD-10-CM

## 2023-07-28 DIAGNOSIS — M77.11 RIGHT LATERAL EPICONDYLITIS: ICD-10-CM

## 2023-07-28 DIAGNOSIS — R60.0 LOCALIZED EDEMA: ICD-10-CM

## 2023-07-28 PROCEDURE — 3044F HG A1C LEVEL LT 7.0%: CPT | Performed by: FAMILY MEDICINE

## 2023-07-28 PROCEDURE — 99214 OFFICE O/P EST MOD 30 MIN: CPT | Performed by: FAMILY MEDICINE

## 2023-07-28 PROCEDURE — 1160F RVW MEDS BY RX/DR IN RCRD: CPT | Performed by: FAMILY MEDICINE

## 2023-07-28 PROCEDURE — 1159F MED LIST DOCD IN RCRD: CPT | Performed by: FAMILY MEDICINE

## 2023-07-28 RX ORDER — FUROSEMIDE 40 MG/1
40 TABLET ORAL DAILY
Qty: 90 TABLET | Refills: 1 | Status: SHIPPED | OUTPATIENT
Start: 2023-07-28

## 2023-07-28 RX ORDER — LISINOPRIL 10 MG/1
10 TABLET ORAL DAILY
Qty: 90 TABLET | Refills: 1 | Status: SHIPPED | OUTPATIENT
Start: 2023-07-28

## 2023-07-28 NOTE — PROGRESS NOTES
"Mendez Deluna     VITALS: Blood pressure 126/78, pulse 90, temperature 98.4 øF (36.9 øC), temperature source Temporal, resp. rate 16, height 172.7 cm (68\"), weight 119 kg (263 lb 6.4 oz), SpO2 96 %.    Subjective  Chief Complaint  Elbow Pain (Right/)    Subjective          History of Present Illness:  The patient is a 52 y.o.  male with medical conditions significant for hyperlipidemia, gastroesophageal reflux disease, and hypertension, presents to the clinic secondary to medical follow-up.    He has tennis elbow. He has no pain frequently, especially when he wakes up in the morning, but when he starts to use it, he feels a little pain. He is right-handed. He can feel his carpal tunnel is irritated more. He denies any neck pain. He has done things on the wall to try to stretch it, and that sort of helps. He has had it for a month. He has lost strength. He has numbness and tingling because of the pain. He has carpal tunnel syndrome on both hands. He has tingling in his bilateral hands. He denies any pain in the wrist, but when he squeezes it, he can feel it affecting his wrist. He also has a bone callus from breaking his arm and it has always affected anything. He had a nerve test for it a few years ago. He remembers his de Quervain's tenosynovitis. He went to a hand specialist who wanted to do surgery on him immediately, but he did not want that to happen. He did some stretches, and he acknowledged his carpal tunnel syndrome as well. His left hand has more tingling. He does not remember where he had the nerve test done. He lost his job. He had tried some Delta 9 gummies, and his inflammation just went away. He stopped taking them because he has no job and he needs to urinate in a cup. He does not have a prescription for anything. If he were taking them, he would not be visiting me today on07/28/2023 to see if there was any other factor. It developed a month ago before he started working on his house. He has gotten a " cortisone injection in the past, and he never had a problem with it. He would rather do Hoppers if he can do anything. His only real problems are inflammation related. His pinky has been bothering him as well and he is not sure why it has been bothering him. He has trouble bending it out because of the bone callus, he cannot bend his arm across. He can feel it stretching where his deep veins goes. It has been this way since he was 52 years old. When he tries to push down, he feels it up and down his arm. He did not take Lasix this morning on 07/28/2023 because he did not want to be urinating right. He is on Lasix currently.     He has some pains in his chest that concern him from time to time. He did go to a pulmonologist a few years ago. He was told everything looks good. He was told to come back in 3 years. He has not gone back to him.    He has not talked to Dr. Troncoso about his new diabetes. He has type 1 diabetes. He had been taking the 2 pill cocktail of stuff, but he cannot remember what it is. One of them increases his heart rate, so he stopped taking it. It seems to work a little bit, but it increased his heart rate. He stopped them 3 months ago. He never remembers medicine names. When he was 2 years old, his phone number names can be introduced to some of this all of his life. Within 10 seconds, the name is gone. He remembers Protonix. He does not remember other things. He asked Dr. Troncoso to give it 2 things to him when he had seen him the previous time. He told him that he needed to watch his increased heart rate. It went up, so he stopped taking it just. He does not get calories in a day. He has started eating a lot less food and low carb. His girlfriend wants to go out to eat but he likes to cook.     He is overdue for a colonoscopy. He has some operations on right now, but he does have some expensive shoes. He is not sure if they would qualify as diabetic shoes. He had a nonmedical foot exam [place] [facility]  [place] where they got padding. He is not using that today on 07/28/2023 because he was lazy.    He called again to the [facility] and they told him that the referrals are in, but they are in a scheduling review and they will not let him schedule. They called and told him that iit can take up to 3 to 4 days since it has been put in. Their next openings are not until 02/2024 or 03/2024. He called and they faxed it again and they do not have any record of it.    No complaints about any of the medications currently prescribed.     The following portions of the patient's history were reviewed and updated as appropriate: allergies, current medications, past family history, past medical history, past social history, past surgical history and problem list.    Past Medical History  Past Medical History:   Diagnosis Date    Acquired trigger finger     Back pain     Chest pain     Hypercholesterolemia     Type 1 diabetes mellitus        Surgical History  Past Surgical History:   Procedure Laterality Date    WISDOM TOOTH EXTRACTION         Family History  Family History   Problem Relation Age of Onset    Cancer Mother     Hyperlipidemia Mother     Thyroid cancer Mother     Thyroid disease Mother     Diabetes Father     Hyperlipidemia Father     Heart disease Father         Quad bypass       Social History  Social History     Socioeconomic History    Marital status:    Tobacco Use    Smoking status: Never    Smokeless tobacco: Never   Vaping Use    Vaping Use: Never used   Substance and Sexual Activity    Alcohol use: Yes     Comment: social    Drug use: Never    Sexual activity: Defer       Objective   Vital Signs:   /78 (BP Location: Right arm, Patient Position: Sitting, Cuff Size: Large Adult)   Pulse 90   Temp 98.4 øF (36.9 øC) (Temporal)   Resp 16   Ht 172.7 cm (68\")   Wt 119 kg (263 lb 6.4 oz)   SpO2 96%   BMI 40.05 kg/mý     Physical Exam     Gen: Patient in NAD. Pleasant and answers " appropriately. A&Ox3.    Skin: Warm and dry with normal turgor. No purpura, rashes, or unusual pigmentation noted. Hair is normal in appearance and distribution.    HEENT: NC/AT. No lesions noted. Conjunctiva clear, sclera nonicteric. PERRL. EOMI without nystagmus or strabismus. Fundi appear benign. No hemorrhages or exudates of eyes. Auditory canals are patent bilaterally without lesions. TMs intact,  nonerythematous, nonbulging without lesions. Nasal mucosa pink, nonerythematous, and nonedematous. Frontal and maxillary sinuses are nontender. O/P nonerythematous and moist without exudate.    Neck: Supple without lymph nodes palpated. FROM.     Lungs: CTA B/L without rales, rhonchi, crackles, or wheezes.    Heart: RRR. S1 and S2 normal. No S3 or S4. No MRGT.    Abd: Soft, nontender,nondistended. (+)BSx4 quadrants.     Extrem: No CCE. Radial pulses 2+/4 and equal B/L. FROMx4. No bone, joint, or muscle tenderness noted.    Neuro: No focal motor/sensory deficits.    Procedures       Assessment and Plan    Mendez Deluna is a 52 y.o. here for medical followup.    Problem List Items Addressed This Visit    None  Visit Diagnoses       Hypertension associated with diabetes    -  Primary    Relevant Medications    lisinopril (PRINIVIL,ZESTRIL) 10 MG tablet    furosemide (LASIX) 40 MG tablet    Gastroesophageal reflux disease without esophagitis        Localized edema        Relevant Medications    furosemide (LASIX) 40 MG tablet    Other Relevant Orders    CK    Magnesium    TSH    Vitamin D 25 hydroxy    Right lateral epicondylitis        Relevant Orders    Ambulatory Referral to Physical Therapy Evaluate and treat (Completed)    Bilateral arm pain        Relevant Orders    Ambulatory Referral to Physical Therapy Evaluate and treat (Completed)    Uric acid    Encounter for screening colonoscopy        Relevant Orders    Ambulatory Referral For Screening Colonoscopy    Encounter for screening for malignant neoplasm of  prostate        Fatigue, unspecified type        Relevant Orders    CK    Magnesium    TSH    Vitamin D 25 hydroxy            1. Carpal tunnel syndrome, bilateral hands.  - I will refer the patient to physical therapy.    2. Type 1 diabetes.  - I will refer the patient to Dr. Troncoso.    3. Hypertension.  - I will order an EKG today on 07/28/2023.    4. Health maintenance.  - I will refer the patient for a colonoscopy.  - I will order laboratory results on 07/28/2023.     5. Weight loss.  - I advised the patient to continue to work on weight loss.    6. Tenosynovitis.  - I will refer the patient to physical therapy.    7. Tenosynovitis.  - We will refer the patient to physical therapy.    8. Tinnis elbow.  - We will refer the patient to physical therapy.        Class 3 Severe Obesity (BMI >=40). Obesity-related health conditions include the following: diabetes mellitus. Obesity is unchanged. BMI is is above average; BMI management plan is completed. We discussed portion control and increasing exercise.                 Follow Up   Return in about 3 months (around 10/28/2023), or LABS.  Findings and plans discussed with patient who verbalizes understanding and agreement. Will followup with patient once results are in. Patient was given instructions and counseling regarding his condition or for health maintenance advice. Please see specific information pulled into the AVS if appropriate.               Transcribed from ambient dictation for Magalis Malcolm MD by Kena Rg.  07/28/23   10:03 EDT      Magalis Malcolm MD

## 2023-07-28 NOTE — PROGRESS NOTES
The 10-year ASCVD risk score (Tim DURAN, et al., 2019) is: 12.1%    Values used to calculate the score:      Age: 52 years      Sex: Male      Is Non- : No      Diabetic: Yes      Tobacco smoker: No      Systolic Blood Pressure: 126 mmHg      Is BP treated: Yes      HDL Cholesterol: 42 mg/dL      Total Cholesterol: 192 mg/dL

## 2023-07-29 LAB
25(OH)D3+25(OH)D2 SERPL-MCNC: 31.6 NG/ML (ref 30–100)
ALBUMIN SERPL-MCNC: 4.1 G/DL (ref 3.5–5.2)
ALBUMIN/GLOB SERPL: 2 G/DL
ALP SERPL-CCNC: 48 U/L (ref 39–117)
ALT SERPL-CCNC: 22 U/L (ref 1–41)
AST SERPL-CCNC: 15 U/L (ref 1–40)
BILIRUB SERPL-MCNC: 0.4 MG/DL (ref 0–1.2)
BUN SERPL-MCNC: 16 MG/DL (ref 6–20)
BUN/CREAT SERPL: 18.6 (ref 7–25)
CALCIUM SERPL-MCNC: 8.8 MG/DL (ref 8.6–10.5)
CHLORIDE SERPL-SCNC: 104 MMOL/L (ref 98–107)
CHOLEST SERPL-MCNC: 157 MG/DL (ref 0–200)
CK SERPL-CCNC: 58 U/L (ref 20–200)
CO2 SERPL-SCNC: 24.7 MMOL/L (ref 22–29)
CREAT SERPL-MCNC: 0.86 MG/DL (ref 0.76–1.27)
EGFRCR SERPLBLD CKD-EPI 2021: 102.9 ML/MIN/1.73
GLOBULIN SER CALC-MCNC: 2.1 GM/DL
GLUCOSE SERPL-MCNC: 91 MG/DL (ref 65–99)
HBA1C MFR BLD: 6.5 % (ref 4.8–5.6)
HDLC SERPL-MCNC: 40 MG/DL (ref 40–60)
IMP & REVIEW OF LAB RESULTS: NORMAL
LDLC SERPL CALC-MCNC: 101 MG/DL (ref 0–100)
MAGNESIUM SERPL-MCNC: 2.2 MG/DL (ref 1.6–2.6)
MICROALBUMIN UR-MCNC: 8.3 UG/ML
POTASSIUM SERPL-SCNC: 4.2 MMOL/L (ref 3.5–5.2)
PROT SERPL-MCNC: 6.2 G/DL (ref 6–8.5)
PSA SERPL-MCNC: 0.43 NG/ML (ref 0–4)
SODIUM SERPL-SCNC: 140 MMOL/L (ref 136–145)
TRIGL SERPL-MCNC: 86 MG/DL (ref 0–150)
TSH SERPL DL<=0.005 MIU/L-ACNC: 2.38 UIU/ML (ref 0.27–4.2)
URATE SERPL-MCNC: 5.1 MG/DL (ref 3.4–7)
VIT B12 SERPL-MCNC: 369 PG/ML (ref 211–946)
VLDLC SERPL CALC-MCNC: 16 MG/DL (ref 5–40)

## 2023-07-31 ENCOUNTER — PRIOR AUTHORIZATION (OUTPATIENT)
Dept: ENDOCRINOLOGY | Facility: CLINIC | Age: 53
End: 2023-07-31
Payer: MEDICAID

## 2023-07-31 RX ORDER — INSULIN GLARGINE 300 U/ML
INJECTION, SOLUTION SUBCUTANEOUS
Qty: 6 ML | Refills: 4 | Status: SHIPPED | OUTPATIENT
Start: 2023-07-31

## 2023-08-04 ENCOUNTER — OFFICE VISIT (OUTPATIENT)
Dept: FAMILY MEDICINE CLINIC | Facility: CLINIC | Age: 53
End: 2023-08-04
Payer: MEDICAID

## 2023-08-04 VITALS
WEIGHT: 256.8 LBS | OXYGEN SATURATION: 96 % | HEART RATE: 93 BPM | DIASTOLIC BLOOD PRESSURE: 74 MMHG | HEIGHT: 68 IN | SYSTOLIC BLOOD PRESSURE: 126 MMHG | BODY MASS INDEX: 38.92 KG/M2 | TEMPERATURE: 97.5 F

## 2023-08-04 DIAGNOSIS — L03.213 PERIORBITAL CELLULITIS OF LEFT EYE: ICD-10-CM

## 2023-08-04 DIAGNOSIS — R60.0 LOCALIZED EDEMA: Primary | ICD-10-CM

## 2023-08-04 PROCEDURE — 1160F RVW MEDS BY RX/DR IN RCRD: CPT | Performed by: FAMILY MEDICINE

## 2023-08-04 PROCEDURE — 1159F MED LIST DOCD IN RCRD: CPT | Performed by: FAMILY MEDICINE

## 2023-08-04 PROCEDURE — 3044F HG A1C LEVEL LT 7.0%: CPT | Performed by: FAMILY MEDICINE

## 2023-08-04 PROCEDURE — 99213 OFFICE O/P EST LOW 20 MIN: CPT | Performed by: FAMILY MEDICINE

## 2023-08-04 RX ORDER — AMOXICILLIN AND CLAVULANATE POTASSIUM 875; 125 MG/1; MG/1
1 TABLET, FILM COATED ORAL 2 TIMES DAILY
Qty: 20 TABLET | Refills: 0 | Status: SHIPPED | OUTPATIENT
Start: 2023-08-04

## 2023-08-04 RX ORDER — OFLOXACIN 3 MG/ML
1 SOLUTION/ DROPS OPHTHALMIC 4 TIMES DAILY
Qty: 10 ML | Refills: 0 | Status: SHIPPED | OUTPATIENT
Start: 2023-08-04

## 2023-08-04 RX ORDER — BUMETANIDE 0.5 MG/1
0.5 TABLET ORAL 2 TIMES DAILY
Qty: 60 TABLET | Refills: 0 | Status: SHIPPED | OUTPATIENT
Start: 2023-08-04

## 2023-08-04 NOTE — PROGRESS NOTES
"Mendez Deluna     VITALS: Blood pressure 126/74, pulse 93, temperature 97.5 øF (36.4 øC), temperature source Temporal, height 172.7 cm (68\"), weight 116 kg (256 lb 12.8 oz), SpO2 96 %.    Subjective  Chief Complaint  Blepharitis    Subjective          History of Present Illness:    Patient is a 52-year-old male with medical conditions significant for type 1 diabetes and GERD along with hyperlipidemia who presents to clinic for medical follow-up. He is complaining about an eye infection today.    The patient had his glasses broken and glued them. He put on some old contact lenses. He denies any fevers. His glucose has been higher than normal, and he has to take more insulin. He denies any discharge. His vision has been a little cloudy and it is tender to the touch of the eyelid. It is not itchy. He had an antibiotic eyedrop before and it worked great. The only thing he has used is some eye moisturizer. When he wakes up in the morning, it feels a little matted. He denies any blurry vision after washing. He has mild pain in the eye. It does not hurt when he moves the eye.     The patient has been taking Lasix and as of late he will take some sodium. He always wears socks. When he does not take anything, he feels a little bit bloated after a second or third day. He has plenty of Lasix right now and if it does not do good, he can go back on it.    The patient has been on Zestril.    The following portions of the patient's history were reviewed and updated as appropriate: allergies, current medications, past family history, past medical history, past social history, past surgical history and problem list.    Past Medical History  Past Medical History:   Diagnosis Date    Acquired trigger finger     Back pain     Chest pain     Hypercholesterolemia     Type 1 diabetes mellitus        Surgical History  Past Surgical History:   Procedure Laterality Date    WISDOM TOOTH EXTRACTION         Family History  Family History " "  Problem Relation Age of Onset    Cancer Mother     Hyperlipidemia Mother     Thyroid cancer Mother     Thyroid disease Mother     Diabetes Father     Hyperlipidemia Father     Heart disease Father         Quad bypass       Social History  Social History     Socioeconomic History    Marital status:    Tobacco Use    Smoking status: Never    Smokeless tobacco: Never   Vaping Use    Vaping Use: Never used   Substance and Sexual Activity    Alcohol use: Yes     Comment: social    Drug use: Never    Sexual activity: Defer       Objective   Vital Signs:   /74 (BP Location: Right arm, Patient Position: Sitting, Cuff Size: Adult)   Pulse 93   Temp 97.5 øF (36.4 øC) (Temporal)   Ht 172.7 cm (68\")   Wt 116 kg (256 lb 12.8 oz)   SpO2 96%   BMI 39.05 kg/mý     Physical Exam     Gen: Patient in NAD. Pleasant and answers appropriately. A&Ox3.    Skin: Warm and dry with normal turgor. No purpura, rashes, or unusual pigmentation noted. Hair is normal in appearance and distribution.    HEENT: NC/AT. No lesions noted. Conjunctiva injected, sclera nonicteric. PERRL. EOMI without nystagmus or strabismus. Fundi appear benign.  Some edema of the left eye. Auditory canals are patent bilaterally without lesions. TMs intact,  nonerythematous, nonbulging without lesions. Nasal mucosa pink, erythematous, and nonedematous. Frontal and maxillary sinuses are nontender. O/P erythematous and moist without exudate.    Neck: Supple without lymph nodes palpated. FROM.     Lungs: Decreased B/L without rales, rhonchi, crackles, or wheezes.    Heart: RRR. S1 and S2 normal. No S3 or S4. No MRGT.    Abd: Soft, nontender,nondistended. (+)BSx4 quadrants.     Extrem: No CC.  Trace edema bilateral lower extremities.  Radial pulses 2+/4 and equal B/L. FROMx4. No bone, joint, or muscle tenderness noted.    Neuro: No focal motor/sensory deficits.      Procedures     Assessment and Plan    This is a 52-year-old male who presents to clinic for " medical follow-up.    1.  Blepharitis  - I will prescribe Augmentin.  - I advised the patient to use a hot rag a couple of times a day.  - If his symptoms do not improve by Monday, 08/09/2022, he will call me.  - If his symptoms do not improve by next week, we will do IV antibiotics.    2. Bloating.  - I will prescribe Bumex twice a day.    3. Type 1 diabetes.  - The patient's hemoglobin A1c is 6.5 percent.  - He will continue his current medication regimen.    4. Hyperlipidemia.  - The patient's cholesterol is well controlled at this time.  - He will continue his current medication regimen.      Problem List Items Addressed This Visit    None  Visit Diagnoses       Localized edema    -  Primary    Relevant Medications    bumetanide (BUMEX) 0.5 MG tablet    Periorbital cellulitis of left eye        Relevant Medications    ofloxacin (OCUFLOX) 0.3 % ophthalmic solution    amoxicillin-clavulanate (AUGMENTIN) 875-125 MG per tablet            Class 2 Severe Obesity (BMI >=35 and <=39.9). Obesity-related health conditions include the following: diabetes mellitus. Obesity is unchanged. BMI is is above average; BMI management plan is completed. We discussed portion control and increasing exercise.               Follow Up   No follow-ups on file.  Findings and plans discussed with patient who verbalizes understanding and agreement. Will followup with patient once results are in. Patient was given instructions and counseling regarding his condition or for health maintenance advice. Please see specific information pulled into the AVS if appropriate.     Transcribed from ambient dictation for Magalis Malcolm MD by Petra Garza.  08/04/23   13:50 EDT    Patient or patient representative verbalized consent to the visit recording.  I have personally performed the services described in this document as transcribed by the above individual, and it is both accurate and complete.

## 2023-09-07 DIAGNOSIS — R60.0 LOCALIZED EDEMA: ICD-10-CM

## 2023-09-07 RX ORDER — FUROSEMIDE 40 MG/1
40 TABLET ORAL DAILY
Qty: 90 TABLET | Refills: 1 | OUTPATIENT
Start: 2023-09-07

## 2023-09-08 ENCOUNTER — HOSPITAL ENCOUNTER (EMERGENCY)
Facility: HOSPITAL | Age: 53
Discharge: HOME | End: 2023-09-09
Attending: EMERGENCY MEDICINE
Payer: MEDICAID

## 2023-09-08 ENCOUNTER — APPOINTMENT (OUTPATIENT)
Dept: CT IMAGING | Facility: HOSPITAL | Age: 53
End: 2023-09-08
Payer: MEDICAID

## 2023-09-08 DIAGNOSIS — N20.1 LEFT URETERAL STONE: Primary | ICD-10-CM

## 2023-09-08 LAB
ALBUMIN SERPL-MCNC: 3.8 G/DL (ref 3.5–5.2)
ALBUMIN/GLOB SERPL: 1.3 G/DL
ALP SERPL-CCNC: 46 U/L (ref 39–117)
ALT SERPL W P-5'-P-CCNC: 24 U/L (ref 1–41)
ANION GAP SERPL CALCULATED.3IONS-SCNC: 8.9 MMOL/L (ref 5–15)
AST SERPL-CCNC: 22 U/L (ref 1–40)
BASOPHILS # BLD AUTO: 0.01 10*3/MM3 (ref 0–0.2)
BASOPHILS NFR BLD AUTO: 0.1 % (ref 0–1.5)
BILIRUB SERPL-MCNC: 0.3 MG/DL (ref 0–1.2)
BUN SERPL-MCNC: 18 MG/DL (ref 6–20)
BUN/CREAT SERPL: 13.7 (ref 7–25)
CALCIUM SPEC-SCNC: 9 MG/DL (ref 8.6–10.5)
CHLORIDE SERPL-SCNC: 103 MMOL/L (ref 98–107)
CO2 SERPL-SCNC: 25.1 MMOL/L (ref 22–29)
CREAT SERPL-MCNC: 1.31 MG/DL (ref 0.76–1.27)
DEPRECATED RDW RBC AUTO: 41 FL (ref 37–54)
EGFRCR SERPLBLD CKD-EPI 2021: 64.7 ML/MIN/1.73
EOSINOPHIL # BLD AUTO: 0.01 10*3/MM3 (ref 0–0.4)
EOSINOPHIL NFR BLD AUTO: 0.1 % (ref 0.3–6.2)
ERYTHROCYTE [DISTWIDTH] IN BLOOD BY AUTOMATED COUNT: 12.5 % (ref 12.3–15.4)
GLOBULIN UR ELPH-MCNC: 3 GM/DL
GLUCOSE SERPL-MCNC: 163 MG/DL (ref 65–99)
HCT VFR BLD AUTO: 42.7 % (ref 37.5–51)
HGB BLD-MCNC: 14.4 G/DL (ref 13–17.7)
IMM GRANULOCYTES # BLD AUTO: 0.02 10*3/MM3 (ref 0–0.05)
IMM GRANULOCYTES NFR BLD AUTO: 0.2 % (ref 0–0.5)
LIPASE SERPL-CCNC: 17 U/L (ref 13–60)
LYMPHOCYTES # BLD AUTO: 1.27 10*3/MM3 (ref 0.7–3.1)
LYMPHOCYTES NFR BLD AUTO: 12.1 % (ref 19.6–45.3)
MCH RBC QN AUTO: 30.3 PG (ref 26.6–33)
MCHC RBC AUTO-ENTMCNC: 33.7 G/DL (ref 31.5–35.7)
MCV RBC AUTO: 89.9 FL (ref 79–97)
MONOCYTES # BLD AUTO: 0.68 10*3/MM3 (ref 0.1–0.9)
MONOCYTES NFR BLD AUTO: 6.5 % (ref 5–12)
NEUTROPHILS NFR BLD AUTO: 8.48 10*3/MM3 (ref 1.7–7)
NEUTROPHILS NFR BLD AUTO: 81 % (ref 42.7–76)
NRBC BLD AUTO-RTO: 0 /100 WBC (ref 0–0.2)
PLATELET # BLD AUTO: 212 10*3/MM3 (ref 140–450)
PMV BLD AUTO: 11.2 FL (ref 6–12)
POTASSIUM SERPL-SCNC: 4.2 MMOL/L (ref 3.5–5.2)
PROT SERPL-MCNC: 6.8 G/DL (ref 6–8.5)
RBC # BLD AUTO: 4.75 10*6/MM3 (ref 4.14–5.8)
SODIUM SERPL-SCNC: 137 MMOL/L (ref 136–145)
WBC NRBC COR # BLD: 10.47 10*3/MM3 (ref 3.4–10.8)

## 2023-09-08 PROCEDURE — 25010000002 PROCHLORPERAZINE 10 MG/2ML SOLUTION: Performed by: EMERGENCY MEDICINE

## 2023-09-08 PROCEDURE — 96375 TX/PRO/DX INJ NEW DRUG ADDON: CPT

## 2023-09-08 PROCEDURE — 74176 CT ABD & PELVIS W/O CONTRAST: CPT

## 2023-09-08 PROCEDURE — 80053 COMPREHEN METABOLIC PANEL: CPT | Performed by: EMERGENCY MEDICINE

## 2023-09-08 PROCEDURE — 99284 EMERGENCY DEPT VISIT MOD MDM: CPT

## 2023-09-08 PROCEDURE — 83690 ASSAY OF LIPASE: CPT | Performed by: EMERGENCY MEDICINE

## 2023-09-08 PROCEDURE — 96374 THER/PROPH/DIAG INJ IV PUSH: CPT

## 2023-09-08 PROCEDURE — 96361 HYDRATE IV INFUSION ADD-ON: CPT

## 2023-09-08 PROCEDURE — 85025 COMPLETE CBC W/AUTO DIFF WBC: CPT | Performed by: EMERGENCY MEDICINE

## 2023-09-08 PROCEDURE — 36415 COLL VENOUS BLD VENIPUNCTURE: CPT

## 2023-09-08 PROCEDURE — 25010000002 HYDROMORPHONE PER 4 MG: Performed by: EMERGENCY MEDICINE

## 2023-09-08 RX ORDER — PROCHLORPERAZINE EDISYLATE 5 MG/ML
2.5 INJECTION INTRAMUSCULAR; INTRAVENOUS ONCE
Status: COMPLETED | OUTPATIENT
Start: 2023-09-08 | End: 2023-09-08

## 2023-09-08 RX ORDER — SODIUM CHLORIDE 9 MG/ML
125 INJECTION, SOLUTION INTRAVENOUS CONTINUOUS
Status: DISCONTINUED | OUTPATIENT
Start: 2023-09-08 | End: 2023-09-09 | Stop reason: HOSPADM

## 2023-09-08 RX ORDER — HYDROMORPHONE HYDROCHLORIDE 1 MG/ML
0.5 INJECTION, SOLUTION INTRAMUSCULAR; INTRAVENOUS; SUBCUTANEOUS ONCE
Status: COMPLETED | OUTPATIENT
Start: 2023-09-08 | End: 2023-09-08

## 2023-09-08 RX ADMIN — SODIUM CHLORIDE 125 ML/HR: 9 INJECTION, SOLUTION INTRAVENOUS at 23:28

## 2023-09-08 RX ADMIN — PROCHLORPERAZINE EDISYLATE 2.5 MG: 5 INJECTION INTRAMUSCULAR; INTRAVENOUS at 23:28

## 2023-09-08 RX ADMIN — HYDROMORPHONE HYDROCHLORIDE 0.5 MG: 1 INJECTION, SOLUTION INTRAMUSCULAR; INTRAVENOUS; SUBCUTANEOUS at 23:28

## 2023-09-08 RX ADMIN — SODIUM CHLORIDE 500 ML: 9 INJECTION, SOLUTION INTRAVENOUS at 23:28

## 2023-09-09 VITALS
HEIGHT: 69 IN | OXYGEN SATURATION: 99 % | WEIGHT: 255 LBS | DIASTOLIC BLOOD PRESSURE: 71 MMHG | BODY MASS INDEX: 37.77 KG/M2 | RESPIRATION RATE: 18 BRPM | TEMPERATURE: 98.5 F | HEART RATE: 83 BPM | SYSTOLIC BLOOD PRESSURE: 143 MMHG

## 2023-09-09 LAB
BACTERIA UR QL AUTO: ABNORMAL /HPF
BILIRUB UR QL STRIP: NEGATIVE
CLARITY UR: ABNORMAL
COLOR UR: YELLOW
GLUCOSE UR STRIP-MCNC: NEGATIVE MG/DL
HGB UR QL STRIP.AUTO: ABNORMAL
HYALINE CASTS UR QL AUTO: ABNORMAL /LPF
KETONES UR QL STRIP: ABNORMAL
LEUKOCYTE ESTERASE UR QL STRIP.AUTO: NEGATIVE
NITRITE UR QL STRIP: NEGATIVE
PH UR STRIP.AUTO: 5.5 [PH] (ref 5–8)
PROT UR QL STRIP: NEGATIVE
RBC # UR STRIP: ABNORMAL /HPF
REF LAB TEST METHOD: ABNORMAL
SP GR UR STRIP: 1.01 (ref 1–1.03)
SQUAMOUS #/AREA URNS HPF: ABNORMAL /HPF
UROBILINOGEN UR QL STRIP: ABNORMAL
WBC # UR STRIP: ABNORMAL /HPF

## 2023-09-09 PROCEDURE — 96376 TX/PRO/DX INJ SAME DRUG ADON: CPT

## 2023-09-09 PROCEDURE — 96361 HYDRATE IV INFUSION ADD-ON: CPT

## 2023-09-09 PROCEDURE — 81001 URINALYSIS AUTO W/SCOPE: CPT | Performed by: EMERGENCY MEDICINE

## 2023-09-09 PROCEDURE — 63710000001 PROMETHAZINE PER 25 MG: Performed by: EMERGENCY MEDICINE

## 2023-09-09 PROCEDURE — 25010000002 HYDROMORPHONE 1 MG/ML SOLUTION: Performed by: EMERGENCY MEDICINE

## 2023-09-09 RX ORDER — OXYCODONE HYDROCHLORIDE AND ACETAMINOPHEN 5; 325 MG/1; MG/1
1 TABLET ORAL EVERY 6 HOURS PRN
Qty: 10 TABLET | Refills: 0 | Status: SHIPPED | OUTPATIENT
Start: 2023-09-09

## 2023-09-09 RX ORDER — PROMETHAZINE HYDROCHLORIDE 25 MG/1
25 TABLET ORAL EVERY 6 HOURS PRN
Status: COMPLETED | OUTPATIENT
Start: 2023-09-09 | End: 2023-09-09

## 2023-09-09 RX ORDER — TAMSULOSIN HYDROCHLORIDE 0.4 MG/1
0.4 CAPSULE ORAL ONCE
Status: COMPLETED | OUTPATIENT
Start: 2023-09-09 | End: 2023-09-09

## 2023-09-09 RX ORDER — KETOROLAC TROMETHAMINE 10 MG/1
10 TABLET, FILM COATED ORAL EVERY 6 HOURS PRN
Status: DISCONTINUED | OUTPATIENT
Start: 2023-09-09 | End: 2023-09-09

## 2023-09-09 RX ORDER — HYDROMORPHONE HYDROCHLORIDE 1 MG/ML
0.5 INJECTION, SOLUTION INTRAMUSCULAR; INTRAVENOUS; SUBCUTANEOUS ONCE
Status: COMPLETED | OUTPATIENT
Start: 2023-09-09 | End: 2023-09-09

## 2023-09-09 RX ORDER — PROMETHAZINE HYDROCHLORIDE 25 MG/1
25 TABLET ORAL EVERY 6 HOURS PRN
Qty: 12 TABLET | Refills: 0 | Status: SHIPPED | OUTPATIENT
Start: 2023-09-09

## 2023-09-09 RX ORDER — OXYCODONE HYDROCHLORIDE AND ACETAMINOPHEN 5; 325 MG/1; MG/1
1 TABLET ORAL EVERY 4 HOURS PRN
Status: DISCONTINUED | OUTPATIENT
Start: 2023-09-09 | End: 2023-09-09 | Stop reason: HOSPADM

## 2023-09-09 RX ORDER — TAMSULOSIN HYDROCHLORIDE 0.4 MG/1
1 CAPSULE ORAL DAILY
Qty: 7 CAPSULE | Refills: 0 | Status: SHIPPED | OUTPATIENT
Start: 2023-09-09 | End: 2023-09-11 | Stop reason: SDUPTHER

## 2023-09-09 RX ADMIN — HYDROMORPHONE HYDROCHLORIDE 0.5 MG: 1 INJECTION, SOLUTION INTRAMUSCULAR; INTRAVENOUS; SUBCUTANEOUS at 01:26

## 2023-09-09 RX ADMIN — TAMSULOSIN HYDROCHLORIDE 0.4 MG: 0.4 CAPSULE ORAL at 02:44

## 2023-09-09 RX ADMIN — PROMETHAZINE HYDROCHLORIDE 25 MG: 25 TABLET ORAL at 03:01

## 2023-09-09 NOTE — ED PROVIDER NOTES
Subjective   History of Present Illness  52-year-old male complains the onset 9:00 tonight of left flank pain, severe at times.  He has never had pain like this before.  He has no history of kidney stones.  He denies fever, chills, chest pain, shortness of breath, vomiting, dysuria, hematuria, other symptoms or other complaints.    Review of Systems   All other systems reviewed and are negative.    Past Medical History:   Diagnosis Date    Acquired trigger finger     Back pain     Chest pain     Hypercholesterolemia     Type 1 diabetes mellitus        No Known Allergies    Past Surgical History:   Procedure Laterality Date    WISDOM TOOTH EXTRACTION         Family History   Problem Relation Age of Onset    Cancer Mother     Hyperlipidemia Mother     Thyroid cancer Mother     Thyroid disease Mother     Diabetes Father     Hyperlipidemia Father     Heart disease Father         Quad bypass       Social History     Socioeconomic History    Marital status:    Tobacco Use    Smoking status: Never    Smokeless tobacco: Never   Vaping Use    Vaping Use: Never used   Substance and Sexual Activity    Alcohol use: Yes     Comment: social    Drug use: Never    Sexual activity: Defer           Objective   Physical Exam  Vitals and nursing note reviewed.   Constitutional:       General: He is not in acute distress.     Appearance: Normal appearance. He is well-developed. He is not ill-appearing, toxic-appearing or diaphoretic.      Comments: Pleasant male, alert and well-oriented, does not appear to be in acute distress.   HENT:      Head: Normocephalic and atraumatic.   Eyes:      General: No scleral icterus.     Pupils: Pupils are equal, round, and reactive to light.   Neck:      Trachea: No tracheal deviation.   Cardiovascular:      Rate and Rhythm: Normal rate and regular rhythm.   Pulmonary:      Effort: Pulmonary effort is normal. No respiratory distress.      Breath sounds: Normal breath sounds.   Chest:      Chest  wall: No tenderness.   Abdominal:      General: Bowel sounds are normal.      Palpations: Abdomen is soft.      Tenderness: There is abdominal tenderness (Minimal tenderness left flank left mid abdomen.  No other tenderness.  No acute peritoneal signs.). There is no right CVA tenderness, left CVA tenderness, guarding or rebound.   Musculoskeletal:         General: No tenderness. Normal range of motion.      Cervical back: Normal range of motion and neck supple. No rigidity or tenderness.      Right lower leg: No edema.      Left lower leg: No edema.   Skin:     General: Skin is warm and dry.      Capillary Refill: Capillary refill takes less than 2 seconds.      Coloration: Skin is not pale.   Neurological:      General: No focal deficit present.      Mental Status: He is alert and oriented to person, place, and time.      GCS: GCS eye subscore is 4. GCS verbal subscore is 5. GCS motor subscore is 6.      Motor: No abnormal muscle tone.   Psychiatric:         Mood and Affect: Mood normal.         Behavior: Behavior normal.       Procedures  CT Abdomen Pelvis Without Contrast   Final Result   Obstructing distal left ureterolithiasis, as above.       This report was finalized on 9/9/2023 12:34 AM by Radha Owens MD.            Results for orders placed or performed during the hospital encounter of 09/08/23   Urinalysis With Microscopic If Indicated (No Culture) - Urine, Clean Catch    Specimen: Urine, Clean Catch   Result Value Ref Range    Color, UA Yellow Yellow, Straw    Appearance, UA Cloudy (A) Clear    pH, UA 5.5 5.0 - 8.0    Specific Gravity, UA 1.015 1.005 - 1.030    Glucose, UA Negative Negative    Ketones, UA Trace (A) Negative    Bilirubin, UA Negative Negative    Blood, UA Large (3+) (A) Negative    Protein, UA Negative Negative    Leuk Esterase, UA Negative Negative    Nitrite, UA Negative Negative    Urobilinogen, UA 0.2 E.U./dL 0.2 - 1.0 E.U./dL   Comprehensive Metabolic Panel    Specimen: Blood    Result Value Ref Range    Glucose 163 (H) 65 - 99 mg/dL    BUN 18 6 - 20 mg/dL    Creatinine 1.31 (H) 0.76 - 1.27 mg/dL    Sodium 137 136 - 145 mmol/L    Potassium 4.2 3.5 - 5.2 mmol/L    Chloride 103 98 - 107 mmol/L    CO2 25.1 22.0 - 29.0 mmol/L    Calcium 9.0 8.6 - 10.5 mg/dL    Total Protein 6.8 6.0 - 8.5 g/dL    Albumin 3.8 3.5 - 5.2 g/dL    ALT (SGPT) 24 1 - 41 U/L    AST (SGOT) 22 1 - 40 U/L    Alkaline Phosphatase 46 39 - 117 U/L    Total Bilirubin 0.3 0.0 - 1.2 mg/dL    Globulin 3.0 gm/dL    A/G Ratio 1.3 g/dL    BUN/Creatinine Ratio 13.7 7.0 - 25.0    Anion Gap 8.9 5.0 - 15.0 mmol/L    eGFR 64.7 >60.0 mL/min/1.73   Lipase    Specimen: Blood   Result Value Ref Range    Lipase 17 13 - 60 U/L   CBC Auto Differential    Specimen: Blood   Result Value Ref Range    WBC 10.47 3.40 - 10.80 10*3/mm3    RBC 4.75 4.14 - 5.80 10*6/mm3    Hemoglobin 14.4 13.0 - 17.7 g/dL    Hematocrit 42.7 37.5 - 51.0 %    MCV 89.9 79.0 - 97.0 fL    MCH 30.3 26.6 - 33.0 pg    MCHC 33.7 31.5 - 35.7 g/dL    RDW 12.5 12.3 - 15.4 %    RDW-SD 41.0 37.0 - 54.0 fl    MPV 11.2 6.0 - 12.0 fL    Platelets 212 140 - 450 10*3/mm3    Neutrophil % 81.0 (H) 42.7 - 76.0 %    Lymphocyte % 12.1 (L) 19.6 - 45.3 %    Monocyte % 6.5 5.0 - 12.0 %    Eosinophil % 0.1 (L) 0.3 - 6.2 %    Basophil % 0.1 0.0 - 1.5 %    Immature Grans % 0.2 0.0 - 0.5 %    Neutrophils, Absolute 8.48 (H) 1.70 - 7.00 10*3/mm3    Lymphocytes, Absolute 1.27 0.70 - 3.10 10*3/mm3    Monocytes, Absolute 0.68 0.10 - 0.90 10*3/mm3    Eosinophils, Absolute 0.01 0.00 - 0.40 10*3/mm3    Basophils, Absolute 0.01 0.00 - 0.20 10*3/mm3    Immature Grans, Absolute 0.02 0.00 - 0.05 10*3/mm3    nRBC 0.0 0.0 - 0.2 /100 WBC   Urinalysis, Microscopic Only - Urine, Clean Catch    Specimen: Urine, Clean Catch   Result Value Ref Range    RBC, UA 13-20 (A) None Seen, 0-2 /HPF    WBC, UA 0-2 None Seen, 0-2 /HPF    Bacteria, UA None Seen None Seen /HPF    Squamous Epithelial Cells, UA 0-2 None Seen, 0-2  /HPF    Hyaline Casts, UA None Seen None Seen /LPF    Methodology Automated Microscopy                 ED Course  ED Course as of 09/09/23 0254   Sat Sep 09, 2023   0205 Patient is doing well.  Patient, his significant other and I discussed his test results and his plan of care.  They voiced understanding and agreement.  Patient's girlfriend is driving him home. [CM]      ED Course User Index  [CM] Ronak Zelaya MD                                           Medical Decision Making  Problems Addressed:  Left ureteral stone: complicated acute illness or injury    Amount and/or Complexity of Data Reviewed  Labs: ordered. Decision-making details documented in ED Course.  Radiology: ordered. Decision-making details documented in ED Course.    Risk  Prescription drug management.        Final diagnoses:   Left ureteral stone       ED Disposition  ED Disposition       ED Disposition   Discharge    Condition   Stable    Comment   --               Jame Montiel MD  [address] [facility]  [address]  [place] [id number] [phone]    Go to   Call Monday morning for first available appointment    Magalis Malcolm MD  [address] [place] [address]  [phone]    Go to   2 to 3 days    [facility] EMERGENCY DEPARTMENT  [address] [facility] [id number] [phone]  Go to   If symptoms worsen         Medication List        New Prescriptions      oxyCODONE-acetaminophen 5-325 MG per tablet  Commonly known as: PERCOCET  Take 1 tablet by mouth Every 6 (Six) Hours As Needed for Severe Pain.     promethazine 25 MG tablet  Commonly known as: PHENERGAN  Take 1 tablet by mouth Every 6 (Six) Hours As Needed for Nausea or Vomiting.     tamsulosin 0.4 MG capsule 24 hr capsule  Commonly known as: FLOMAX  Take 1 capsule by mouth Daily.               Where to Get Your Medications        These medications were sent to [id number] - [place], [place] - [address] AT [facility] [address]   [phone] [facility] [phone] [place]  [address] [place] [address]      Phone: [phone]   oxyCODONE-acetaminophen 5-325 MG per tablet  promethazine 25 MG tablet  tamsulosin 0.4 MG capsule 24 hr capsule       Please note that portions of this note were completed with a voice recognition program.        Ronak Zelaya MD  09/09/23 0255

## 2023-09-09 NOTE — DISCHARGE INSTRUCTIONS
Home in care of family.  Rest.  Drink lots of fluids.  Strain all urine until you pass the stone.  Call Dr. Montiel's office Monday morning for first available appointment.  See Dr. Malcolm in 2 to 3 days.  Return to the emergency department right away if symptoms worsen/any problems.

## 2023-09-11 ENCOUNTER — OFFICE VISIT (OUTPATIENT)
Dept: UROLOGY | Facility: CLINIC | Age: 53
End: 2023-09-11
Payer: MEDICAID

## 2023-09-11 VITALS
BODY MASS INDEX: 38.72 KG/M2 | HEIGHT: 69 IN | HEART RATE: 99 BPM | WEIGHT: 261.4 LBS | SYSTOLIC BLOOD PRESSURE: 132 MMHG | DIASTOLIC BLOOD PRESSURE: 69 MMHG

## 2023-09-11 DIAGNOSIS — N20.1 LEFT URETERAL STONE: Primary | ICD-10-CM

## 2023-09-11 RX ORDER — TAMSULOSIN HYDROCHLORIDE 0.4 MG/1
1 CAPSULE ORAL DAILY
Qty: 30 CAPSULE | Refills: 0 | Status: SHIPPED | OUTPATIENT
Start: 2023-09-11 | End: 2023-09-14

## 2023-09-11 NOTE — PROGRESS NOTES
"Chief Complaint:    Chief Complaint   Patient presents with    Kidney Stone       Vital Signs:   /69   Pulse 99   Ht 174 cm (68.5\")   Wt 119 kg (261 lb 6.4 oz)   BMI 39.16 kg/m²   Body mass index is 39.16 kg/m².      HPI:  Mendez Deluna is a 52 y.o. male who presents today for initial evaluation     History of Present Illness  Mr. Deluna presents to the clinic for initial evaluation of nephrolithiasis.  Patient reports that he started to have left-sided back and flank pain this past Friday and went to the ER for evaluation.  He had a CT scan completed at that time that showed a distal roughly 3 mm UVJ calculus causing some mild obstructive uropathy.  He denies any history of kidney stones prior to this.  There were no other stones identified along the course of the ureters or within the renal collecting systems.  Patient is unsure if he is passed the stone.  States he has had some intermittent sharp pains lasting for only a few seconds since ER visit but has not had to take any pain medication.  He denies any fever, chills, gross hematuria, dysuria, difficulty urinating, frequency, urgency, or incontinence.    Past Medical History:  Past Medical History:   Diagnosis Date    Acquired trigger finger     Back pain     Chest pain     Hypercholesterolemia     Type 1 diabetes mellitus        Current Meds:  Current Outpatient Medications   Medication Sig Dispense Refill    amoxicillin-clavulanate (AUGMENTIN) 875-125 MG per tablet Take 1 tablet by mouth 2 (Two) Times a Day. 20 tablet 0    B-D ULTRAFINE III SHORT PEN 31G X 8 MM misc USE FOR INSULIN INJECTIONS 4 TIMES DAILY 400 each 3    bumetanide (BUMEX) 0.5 MG tablet Take 1 tablet by mouth 2 (Two) Times a Day. 60 tablet 0    Continuous Blood Gluc Sensor (Dexcom G6 Sensor) Every 10 (Ten) Days. 9 each 3    Continuous Blood Gluc Sensor (FreeStyle Ronnie 3 Sensor) misc 1 each Every 14 (Fourteen) Days. 6 each 3    Continuous Blood Gluc Transmit (Dexcom G6 Transmitter) " misc 1 kit Every 3 (Three) Months. 1 each 3    furosemide (LASIX) 40 MG tablet Take 1 tablet by mouth Daily. 90 tablet 1    glucose blood (OneTouch Verio) test strip CHECK BLOOD SUGAR 4 TIMES PER DAY. 400 each 3    Insulin Disposable Pump (Omnipod 5 G6 Intro, Gen 5,) kit 1 kit Take As Directed. 1 kit 0    Insulin Disposable Pump (Omnipod 5 G6 Pod, Gen 5,) misc 1 each Every Other Day. 45 each 3    Insulin Lispro-aabc (Lyumjev KwikPen) 200 UNIT/ML solution pen-injector Inject 300 Units under the skin into the appropriate area as directed Daily. 270 mL 1    lisinopril (PRINIVIL,ZESTRIL) 10 MG tablet Take 1 tablet by mouth Daily. 90 tablet 1    ofloxacin (OCUFLOX) 0.3 % ophthalmic solution Administer 1 drop into the left eye 4 (Four) Times a Day. 10 mL 0    oxyCODONE-acetaminophen (PERCOCET) 5-325 MG per tablet Take 1 tablet by mouth Every 6 (Six) Hours As Needed for Severe Pain. 10 tablet 0    pantoprazole (PROTONIX) 40 MG EC tablet TAKE 1 TABLET BY MOUTH DAILY. 30 tablet 0    promethazine (PHENERGAN) 25 MG tablet Take 1 tablet by mouth Every 6 (Six) Hours As Needed for Nausea or Vomiting. 12 tablet 0    rosuvastatin (CRESTOR) 40 MG tablet Take 1 tablet by mouth Daily. 30 tablet 11    tamsulosin (FLOMAX) 0.4 MG capsule 24 hr capsule Take 1 capsule by mouth Daily. 30 capsule 0    Toujeo Max SoloStar 300 UNIT/ML solution pen-injector injection ADMINISTER 30 UNITS UNDER THE SKIN TWICE DAILY AS DIRECTED 6 mL 4     No current facility-administered medications for this visit.        Allergies:   No Known Allergies     Past Surgical History:  Past Surgical History:   Procedure Laterality Date    WISDOM TOOTH EXTRACTION         Social History:  Social History     Socioeconomic History    Marital status: Other   Tobacco Use    Smoking status: Never    Smokeless tobacco: Never   Vaping Use    Vaping Use: Never used   Substance and Sexual Activity    Alcohol use: Yes     Comment: social    Drug use: Never    Sexual activity:  Defer       Family History:  Family History   Problem Relation Age of Onset    Cancer Mother     Hyperlipidemia Mother     Thyroid cancer Mother     Thyroid disease Mother     Diabetes Father     Hyperlipidemia Father     Heart disease Father         Quad bypass       Review of Systems:  Review of Systems   Constitutional:  Negative for fatigue, fever and unexpected weight change.   Respiratory:  Negative for chest tightness and shortness of breath.    Cardiovascular:  Negative for chest pain.   Gastrointestinal:  Negative for abdominal pain, constipation, diarrhea, nausea and vomiting.   Genitourinary:  Positive for flank pain. Negative for difficulty urinating, dysuria, frequency, hematuria and urgency.   Musculoskeletal:  Positive for back pain.   Skin:  Negative for rash.   Psychiatric/Behavioral:  Negative for confusion and suicidal ideas.      Physical Exam:  Physical Exam  Constitutional:       General: He is not in acute distress.     Appearance: Normal appearance.   HENT:      Head: Normocephalic and atraumatic.      Nose: Nose normal.      Mouth/Throat:      Mouth: Mucous membranes are moist.   Eyes:      Conjunctiva/sclera: Conjunctivae normal.   Cardiovascular:      Rate and Rhythm: Normal rate and regular rhythm.      Pulses: Normal pulses.      Heart sounds: Normal heart sounds.   Pulmonary:      Effort: Pulmonary effort is normal.      Breath sounds: Normal breath sounds.   Abdominal:      General: Bowel sounds are normal.      Palpations: Abdomen is soft.      Tenderness: There is no right CVA tenderness or left CVA tenderness.   Musculoskeletal:         General: Normal range of motion.      Cervical back: Normal range of motion.   Skin:     General: Skin is warm.   Neurological:      General: No focal deficit present.      Mental Status: He is alert and oriented to person, place, and time.   Psychiatric:         Mood and Affect: Mood normal.         Behavior: Behavior normal.         Thought  Content: Thought content normal.         Judgment: Judgment normal.         Recent Image (CT and/or KUB):   CT Abdomen and Pelvis: No results found for this or any previous visit.     CT Stone Protocol: No results found for this or any previous visit.     KUB: No results found for this or any previous visit.       Labs:  Brief Urine Lab Results  (Last result in the past 365 days)        Color   Clarity   Blood   Leuk Est   Nitrite   Protein   CREAT   Urine HCG        09/09/23 0031 Yellow   Cloudy   Large (3+)   Negative   Negative   Negative                 Admission on 09/08/2023, Discharged on 09/09/2023   Component Date Value Ref Range Status    Color, UA 09/09/2023 Yellow  Yellow, Straw Final    Appearance, UA 09/09/2023 Cloudy (A)  Clear Final    pH, UA 09/09/2023 5.5  5.0 - 8.0 Final    Specific Gravity, UA 09/09/2023 1.015  1.005 - 1.030 Final    Glucose, UA 09/09/2023 Negative  Negative Final    Ketones, UA 09/09/2023 Trace (A)  Negative Final    Bilirubin, UA 09/09/2023 Negative  Negative Final    Blood, UA 09/09/2023 Large (3+) (A)  Negative Final    Protein, UA 09/09/2023 Negative  Negative Final    Leuk Esterase, UA 09/09/2023 Negative  Negative Final    Nitrite, UA 09/09/2023 Negative  Negative Final    Urobilinogen, UA 09/09/2023 0.2 E.U./dL  0.2 - 1.0 E.U./dL Final    Glucose 09/08/2023 163 (H)  65 - 99 mg/dL Final    BUN 09/08/2023 18  6 - 20 mg/dL Final    Creatinine 09/08/2023 1.31 (H)  0.76 - 1.27 mg/dL Final    Sodium 09/08/2023 137  136 - 145 mmol/L Final    Potassium 09/08/2023 4.2  3.5 - 5.2 mmol/L Final    1+ Hemolysis    Slight hemolysis detected by analyzer. Results may be affected.    Chloride 09/08/2023 103  98 - 107 mmol/L Final    CO2 09/08/2023 25.1  22.0 - 29.0 mmol/L Final    Calcium 09/08/2023 9.0  8.6 - 10.5 mg/dL Final    Total Protein 09/08/2023 6.8  6.0 - 8.5 g/dL Final    Albumin 09/08/2023 3.8  3.5 - 5.2 g/dL Final    ALT (SGPT) 09/08/2023 24  1 - 41 U/L Final    AST (SGOT)  09/08/2023 22  1 - 40 U/L Final    Alkaline Phosphatase 09/08/2023 46  39 - 117 U/L Final    Total Bilirubin 09/08/2023 0.3  0.0 - 1.2 mg/dL Final    Globulin 09/08/2023 3.0  gm/dL Final    A/G Ratio 09/08/2023 1.3  g/dL Final    BUN/Creatinine Ratio 09/08/2023 13.7  7.0 - 25.0 Final    Anion Gap 09/08/2023 8.9  5.0 - 15.0 mmol/L Final    eGFR 09/08/2023 64.7  >60.0 mL/min/1.73 Final    Lipase 09/08/2023 17  13 - 60 U/L Final    WBC 09/08/2023 10.47  3.40 - 10.80 10*3/mm3 Final    RBC 09/08/2023 4.75  4.14 - 5.80 10*6/mm3 Final    Hemoglobin 09/08/2023 14.4  13.0 - 17.7 g/dL Final    Hematocrit 09/08/2023 42.7  37.5 - 51.0 % Final    MCV 09/08/2023 89.9  79.0 - 97.0 fL Final    MCH 09/08/2023 30.3  26.6 - 33.0 pg Final    MCHC 09/08/2023 33.7  31.5 - 35.7 g/dL Final    RDW 09/08/2023 12.5  12.3 - 15.4 % Final    RDW-SD 09/08/2023 41.0  37.0 - 54.0 fl Final    MPV 09/08/2023 11.2  6.0 - 12.0 fL Final    Platelets 09/08/2023 212  140 - 450 10*3/mm3 Final    Neutrophil % 09/08/2023 81.0 (H)  42.7 - 76.0 % Final    Lymphocyte % 09/08/2023 12.1 (L)  19.6 - 45.3 % Final    Monocyte % 09/08/2023 6.5  5.0 - 12.0 % Final    Eosinophil % 09/08/2023 0.1 (L)  0.3 - 6.2 % Final    Basophil % 09/08/2023 0.1  0.0 - 1.5 % Final    Immature Grans % 09/08/2023 0.2  0.0 - 0.5 % Final    Neutrophils, Absolute 09/08/2023 8.48 (H)  1.70 - 7.00 10*3/mm3 Final    Lymphocytes, Absolute 09/08/2023 1.27  0.70 - 3.10 10*3/mm3 Final    Monocytes, Absolute 09/08/2023 0.68  0.10 - 0.90 10*3/mm3 Final    Eosinophils, Absolute 09/08/2023 0.01  0.00 - 0.40 10*3/mm3 Final    Basophils, Absolute 09/08/2023 0.01  0.00 - 0.20 10*3/mm3 Final    Immature Grans, Absolute 09/08/2023 0.02  0.00 - 0.05 10*3/mm3 Final    nRBC 09/08/2023 0.0  0.0 - 0.2 /100 WBC Final    RBC, UA 09/09/2023 13-20 (A)  None Seen, 0-2 /HPF Final    WBC, UA 09/09/2023 0-2  None Seen, 0-2 /HPF Final    Bacteria, UA 09/09/2023 None Seen  None Seen /HPF Final    Squamous Epithelial  Cells, UA 09/09/2023 0-2  None Seen, 0-2 /HPF Final    Hyaline Casts, UA 09/09/2023 None Seen  None Seen /LPF Final    Methodology 09/09/2023 Automated Microscopy   Final   Office Visit on 07/28/2023   Component Date Value Ref Range Status    Creatine Kinase 07/28/2023 58  20 - 200 U/L Final    Uric Acid 07/28/2023 5.1  3.4 - 7.0 mg/dL Final    Magnesium 07/28/2023 2.2  1.6 - 2.6 mg/dL Final    TSH 07/28/2023 2.380  0.270 - 4.200 uIU/mL Final    25 Hydroxy, Vitamin D 07/28/2023 31.6  30.0 - 100.0 ng/ml Final    Comment: Reference Range for Total Vitamin D 25(OH)  Deficiency <20.0 ng/mL  Insufficiency 21-29 ng/mL  Sufficiency  ng/mL  Toxicity >100 ng/ml      Vitamin B-12 07/28/2023 369  211 - 946 pg/mL Final    Results may be falsely increased if patient taking Biotin.    Microalbumin, Urine 07/28/2023 8.3  Not Estab. ug/mL Final    Total Cholesterol 07/28/2023 157  0 - 200 mg/dL Final    Comment: Cholesterol Reference Ranges  (U.S. Department of Health and Human Services ATP III  Classifications)  Desirable          <200 mg/dL  Borderline High    200-239 mg/dL  High Risk          >240 mg/dL  Triglyceride Reference Ranges  (U.S. Department of Health and Human Services ATP III  Classifications)  Normal           <150 mg/dL  Borderline High  150-199 mg/dL  High             200-499 mg/dL  Very High        >500 mg/dL  HDL Reference Ranges  (U.S. Department of Health and Human Services ATP III  Classifications)  Low     <40 mg/dl (major risk factor for CHD)  High    >60 mg/dl ('negative' risk factor for CHD)  LDL Reference Ranges  (U.S. Department of Health and Human Services ATP III  Classifications)  Optimal          <100 mg/dL  Near Optimal     100-129 mg/dL  Borderline High  130-159 mg/dL  High             160-189 mg/dL  Very High        >189 mg/dL      Triglycerides 07/28/2023 86  0 - 150 mg/dL Final    HDL Cholesterol 07/28/2023 40  40 - 60 mg/dL Final    VLDL Cholesterol Reg 07/28/2023 16  5 - 40 mg/dL Final     LDL Chol Calc (NIH) 07/28/2023 101 (H)  0 - 100 mg/dL Final    Hemoglobin A1C 07/28/2023 6.50 (H)  4.80 - 5.60 % Final    Comment: Hemoglobin A1C Ranges:  Increased Risk for Diabetes  5.7% to 6.4%  Diabetes                     >= 6.5%  Diabetic Goal                < 7.0%      Glucose 07/28/2023 91  65 - 99 mg/dL Final    BUN 07/28/2023 16  6 - 20 mg/dL Final    Creatinine 07/28/2023 0.86  0.76 - 1.27 mg/dL Final    EGFR Result 07/28/2023 102.9  >60.0 mL/min/1.73 Final    Comment: GFR Normal >60  Chronic Kidney Disease <60  Kidney Failure <15      BUN/Creatinine Ratio 07/28/2023 18.6  7.0 - 25.0 Final    Sodium 07/28/2023 140  136 - 145 mmol/L Final    Potassium 07/28/2023 4.2  3.5 - 5.2 mmol/L Final    Chloride 07/28/2023 104  98 - 107 mmol/L Final    Total CO2 07/28/2023 24.7  22.0 - 29.0 mmol/L Final    Calcium 07/28/2023 8.8  8.6 - 10.5 mg/dL Final    Total Protein 07/28/2023 6.2  6.0 - 8.5 g/dL Final    Albumin 07/28/2023 4.1  3.5 - 5.2 g/dL Final    Globulin 07/28/2023 2.1  gm/dL Final    A/G Ratio 07/28/2023 2.0  g/dL Final    Total Bilirubin 07/28/2023 0.4  0.0 - 1.2 mg/dL Final    Alkaline Phosphatase 07/28/2023 48  39 - 117 U/L Final    AST (SGOT) 07/28/2023 15  1 - 40 U/L Final    ALT (SGPT) 07/28/2023 22  1 - 41 U/L Final    PSA 07/28/2023 0.434  0.000 - 4.000 ng/mL Final    Comment: Testing Method: [employer] Electrochemiluminescence  Immunoassay(ECLIA)  Values obtained with different assay methods or kits cannot  be used interchangeably.      Interpretation 07/28/2023 Note   Final    Supplemental report is available.        Procedure: None  Procedures     I have reviewed and agree with the above PMH, PSH, FMH, social history, medications, allergies, and labs.     Assessment/Plan:   Problem List Items Addressed This Visit          Genitourinary and Reproductive     Left ureteral stone - Primary    Relevant Medications    tamsulosin (FLOMAX) 0.4 MG capsule 24 hr capsule       Health Maintenance:    Health Maintenance Due   Topic Date Due    Hepatitis B (1 of 3 - 3-dose series) Never done    COLORECTAL CANCER SCREENING  Never done    Pneumococcal Vaccine 0-64 (1 - PCV) Never done    TDAP/TD VACCINES (1 - Tdap) Never done    ZOSTER VACCINE (2 of 2) 02/12/2020    HEPATITIS C SCREENING  Never done    ANNUAL PHYSICAL  Never done    DIABETIC EYE EXAM  Never done    DIABETIC FOOT EXAM  03/16/2023        Smoking Counseling: Never smoked or used smokeless tobacco.  Counseling given.    Urine Incontinence: Patient reports that he is not currently experiencing any symptoms of urinary incontinence.    Patient was given instructions and counseling regarding his condition or for health maintenance advice. Please see specific information pulled into the AVS if appropriate.    Patient Education:   Renal calculus - It was discussed with the patient the presence of a stone 3 mm right UVJ calculus. We discussed the various therapeutic options available including percutaneous nephrostolithotomy, ureteroscopy and extracorporeal shockwave  lithotripsy.  We discussed the risks of lithotripsy including the passage of stones leading to a 3% chance of Steinstrasse or a large string of stones in the distal ureter. In this incidence the patient was informed that a ureteroscopy is indicated for obstructing fragments.  Patient was informed of an extremely rare incidence of renal hematoma and the significance of this.  Patient was educated on percutaneous nephrostolithotomy and its use as well as the risks and benefits such as the need for postoperative hospitalization, and the risk of damage to the kidney and the remote risk of a nephrectomy.  We also discussed the use of ureteroscopy in the upper tracts and its decreased success rate to completely remove the stones likely causing stent placement leading to an additional procedure for removal.  We discussed the absolute relative indicators for intervention including the presence of  sepsis and uncontrollable pain leading to need for urgent intervention.  We discussed placement of a stent if indicated and the management of the stent as well.  Advised patient that given his small 3 mm calculus is a likelihood of 80% chance of passing the stone voluntarily using medications.  At this time recommend to increase water intake 2 to 3 L/day.  Advised patient to avoid any factors contributing to stone formation such as soda, sweet or unsweet tea, and excess dairy products.  Educated patient to take Flomax once to twice daily.  Advised patient to strain urine is much as possible.  Would like to follow-up with the patient in 2 to 3 weeks if he is passed the stone voluntarily.  If not we can surgically intervene.  Patient verbalized understanding and agreed to plan of care.    Visit Diagnoses:    ICD-10-CM ICD-9-CM   1. Left ureteral stone  N20.1 592.1       Meds Ordered During Visit:  New Medications Ordered This Visit   Medications    tamsulosin (FLOMAX) 0.4 MG capsule 24 hr capsule     Sig: Take 1 capsule by mouth Daily.     Dispense:  30 capsule     Refill:  0       Follow Up Appointment: 3 weeks  No follow-ups on file.      This document has been electronically signed by Chinmay Lynne PA-C   September 12, 2023 08:15 EDT    Part of this note may be an electronic transcription/translation of spoken language to printed text using the Dragon Dictation System.

## 2023-09-12 DIAGNOSIS — Z12.11 ENCOUNTER FOR SCREENING FOR MALIGNANT NEOPLASM OF COLON: Primary | ICD-10-CM

## 2023-09-12 PROBLEM — N20.1 LEFT URETERAL STONE: Status: ACTIVE | Noted: 2023-09-12

## 2023-09-12 RX ORDER — POLYETHYLENE GLYCOL 3350 17 G/17G
510 POWDER, FOR SOLUTION ORAL ONCE
Qty: 510 G | Refills: 0 | Status: SHIPPED | OUTPATIENT
Start: 2023-09-12 | End: 2023-09-12

## 2023-09-12 RX ORDER — BISACODYL 5 MG/1
20 TABLET, DELAYED RELEASE ORAL ONCE
Qty: 4 TABLET | Refills: 0 | Status: SHIPPED | OUTPATIENT
Start: 2023-09-12 | End: 2023-09-12

## 2023-09-14 DIAGNOSIS — N20.1 LEFT URETERAL STONE: ICD-10-CM

## 2023-09-14 RX ORDER — TAMSULOSIN HYDROCHLORIDE 0.4 MG/1
1 CAPSULE ORAL DAILY
Qty: 90 CAPSULE | Refills: 3 | Status: SHIPPED | OUTPATIENT
Start: 2023-09-14

## 2023-09-18 ENCOUNTER — ANESTHESIA EVENT (OUTPATIENT)
Dept: PERIOP | Facility: HOSPITAL | Age: 53
End: 2023-09-18
Payer: MEDICAID

## 2023-09-18 ENCOUNTER — HOSPITAL ENCOUNTER (OUTPATIENT)
Facility: HOSPITAL | Age: 53
Setting detail: HOSPITAL OUTPATIENT SURGERY
Discharge: HOME | End: 2023-09-18
Attending: INTERNAL MEDICINE | Admitting: INTERNAL MEDICINE
Payer: MEDICAID

## 2023-09-18 ENCOUNTER — ANESTHESIA (OUTPATIENT)
Dept: PERIOP | Facility: HOSPITAL | Age: 53
End: 2023-09-18
Payer: MEDICAID

## 2023-09-18 VITALS
SYSTOLIC BLOOD PRESSURE: 115 MMHG | HEART RATE: 76 BPM | BODY MASS INDEX: 37.77 KG/M2 | RESPIRATION RATE: 18 BRPM | TEMPERATURE: 98.1 F | WEIGHT: 255 LBS | OXYGEN SATURATION: 98 % | DIASTOLIC BLOOD PRESSURE: 63 MMHG | HEIGHT: 69 IN

## 2023-09-18 DIAGNOSIS — Z12.11 ENCOUNTER FOR SCREENING FOR MALIGNANT NEOPLASM OF COLON: ICD-10-CM

## 2023-09-18 LAB — GLUCOSE BLDC GLUCOMTR-MCNC: 131 MG/DL (ref 70–130)

## 2023-09-18 PROCEDURE — 25010000002 MIDAZOLAM PER 1 MG: Performed by: NURSE ANESTHETIST, CERTIFIED REGISTERED

## 2023-09-18 PROCEDURE — 25010000002 PROPOFOL 200 MG/20ML EMULSION: Performed by: NURSE ANESTHETIST, CERTIFIED REGISTERED

## 2023-09-18 PROCEDURE — 45378 DIAGNOSTIC COLONOSCOPY: CPT | Performed by: INTERNAL MEDICINE

## 2023-09-18 RX ORDER — MIDAZOLAM HYDROCHLORIDE 1 MG/ML
INJECTION INTRAMUSCULAR; INTRAVENOUS AS NEEDED
Status: DISCONTINUED | OUTPATIENT
Start: 2023-09-18 | End: 2023-09-18 | Stop reason: SURG

## 2023-09-18 RX ORDER — SODIUM CHLORIDE 0.9 % (FLUSH) 0.9 %
10 SYRINGE (ML) INJECTION AS NEEDED
Status: DISCONTINUED | OUTPATIENT
Start: 2023-09-18 | End: 2023-09-18 | Stop reason: HOSPADM

## 2023-09-18 RX ORDER — OXYCODONE HYDROCHLORIDE AND ACETAMINOPHEN 5; 325 MG/1; MG/1
1 TABLET ORAL ONCE AS NEEDED
Status: DISCONTINUED | OUTPATIENT
Start: 2023-09-18 | End: 2023-09-18 | Stop reason: HOSPADM

## 2023-09-18 RX ORDER — IPRATROPIUM BROMIDE AND ALBUTEROL SULFATE 2.5; .5 MG/3ML; MG/3ML
3 SOLUTION RESPIRATORY (INHALATION) ONCE AS NEEDED
Status: DISCONTINUED | OUTPATIENT
Start: 2023-09-18 | End: 2023-09-18 | Stop reason: HOSPADM

## 2023-09-18 RX ORDER — FENTANYL CITRATE 50 UG/ML
50 INJECTION, SOLUTION INTRAMUSCULAR; INTRAVENOUS
Status: DISCONTINUED | OUTPATIENT
Start: 2023-09-18 | End: 2023-09-18 | Stop reason: HOSPADM

## 2023-09-18 RX ORDER — SODIUM CHLORIDE 9 MG/ML
40 INJECTION, SOLUTION INTRAVENOUS AS NEEDED
Status: DISCONTINUED | OUTPATIENT
Start: 2023-09-18 | End: 2023-09-18 | Stop reason: HOSPADM

## 2023-09-18 RX ORDER — MIDAZOLAM HYDROCHLORIDE 1 MG/ML
1 INJECTION INTRAMUSCULAR; INTRAVENOUS
Status: DISCONTINUED | OUTPATIENT
Start: 2023-09-18 | End: 2023-09-18 | Stop reason: HOSPADM

## 2023-09-18 RX ORDER — KETOROLAC TROMETHAMINE 30 MG/ML
30 INJECTION, SOLUTION INTRAMUSCULAR; INTRAVENOUS EVERY 6 HOURS PRN
Status: DISCONTINUED | OUTPATIENT
Start: 2023-09-18 | End: 2023-09-18 | Stop reason: HOSPADM

## 2023-09-18 RX ORDER — SODIUM CHLORIDE 0.9 % (FLUSH) 0.9 %
10 SYRINGE (ML) INJECTION EVERY 12 HOURS SCHEDULED
Status: DISCONTINUED | OUTPATIENT
Start: 2023-09-18 | End: 2023-09-18 | Stop reason: HOSPADM

## 2023-09-18 RX ORDER — ONDANSETRON 2 MG/ML
4 INJECTION INTRAMUSCULAR; INTRAVENOUS AS NEEDED
Status: DISCONTINUED | OUTPATIENT
Start: 2023-09-18 | End: 2023-09-18 | Stop reason: HOSPADM

## 2023-09-18 RX ORDER — MEPERIDINE HYDROCHLORIDE 25 MG/ML
12.5 INJECTION INTRAMUSCULAR; INTRAVENOUS; SUBCUTANEOUS
Status: DISCONTINUED | OUTPATIENT
Start: 2023-09-18 | End: 2023-09-18 | Stop reason: HOSPADM

## 2023-09-18 RX ORDER — SODIUM CHLORIDE, SODIUM LACTATE, POTASSIUM CHLORIDE, CALCIUM CHLORIDE 600; 310; 30; 20 MG/100ML; MG/100ML; MG/100ML; MG/100ML
100 INJECTION, SOLUTION INTRAVENOUS ONCE AS NEEDED
Status: DISCONTINUED | OUTPATIENT
Start: 2023-09-18 | End: 2023-09-18 | Stop reason: HOSPADM

## 2023-09-18 RX ORDER — PROPOFOL 10 MG/ML
INJECTION, EMULSION INTRAVENOUS CONTINUOUS PRN
Status: DISCONTINUED | OUTPATIENT
Start: 2023-09-18 | End: 2023-09-18 | Stop reason: SURG

## 2023-09-18 RX ORDER — SODIUM CHLORIDE, SODIUM LACTATE, POTASSIUM CHLORIDE, CALCIUM CHLORIDE 600; 310; 30; 20 MG/100ML; MG/100ML; MG/100ML; MG/100ML
125 INJECTION, SOLUTION INTRAVENOUS ONCE
Status: COMPLETED | OUTPATIENT
Start: 2023-09-18 | End: 2023-09-18

## 2023-09-18 RX ORDER — LIDOCAINE HYDROCHLORIDE 20 MG/ML
INJECTION, SOLUTION EPIDURAL; INFILTRATION; INTRACAUDAL; PERINEURAL AS NEEDED
Status: DISCONTINUED | OUTPATIENT
Start: 2023-09-18 | End: 2023-09-18 | Stop reason: SURG

## 2023-09-18 RX ADMIN — MIDAZOLAM HYDROCHLORIDE 2 MG: 1 INJECTION, SOLUTION INTRAMUSCULAR; INTRAVENOUS at 10:48

## 2023-09-18 RX ADMIN — SODIUM CHLORIDE, POTASSIUM CHLORIDE, SODIUM LACTATE AND CALCIUM CHLORIDE: 600; 310; 30; 20 INJECTION, SOLUTION INTRAVENOUS at 10:48

## 2023-09-18 RX ADMIN — PROPOFOL 250 MCG/KG/MIN: 10 INJECTION, EMULSION INTRAVENOUS at 10:48

## 2023-09-18 RX ADMIN — LIDOCAINE HYDROCHLORIDE 60 MG: 20 INJECTION, SOLUTION EPIDURAL; INFILTRATION; INTRACAUDAL; PERINEURAL at 10:48

## 2023-09-18 NOTE — ANESTHESIA PREPROCEDURE EVALUATION
Anesthesia Evaluation     no history of anesthetic complications:   NPO Solid Status: > 8 hours  NPO Liquid Status: > 8 hours           Airway   Mallampati: II  TM distance: >3 FB  Neck ROM: full  No difficulty expected  Dental - normal exam     Pulmonary - normal exam   Cardiovascular - normal exam    (+) hyperlipidemia      Neuro/Psych  GI/Hepatic/Renal/Endo    (+) obesity, morbid obesity, renal disease, diabetes mellitus    Musculoskeletal     (+) back pain  Abdominal  - normal exam    Bowel sounds: normal.   Substance History      OB/GYN          Other                      Anesthesia Plan    ASA 3     general     intravenous induction     Anesthetic plan, risks, benefits, and alternatives have been provided, discussed and informed consent has been obtained with: patient.    CODE STATUS:

## 2023-09-18 NOTE — ANESTHESIA POSTPROCEDURE EVALUATION
Patient: Mendez Deluna    Procedure Summary       Date: 09/18/23 Room / Location:  [facility]  /  [facility]    Anesthesia Start: 1048 Anesthesia Stop: 1118    Procedure: COLONOSCOPY Diagnosis:       Encounter for screening for malignant neoplasm of colon      (Encounter for screening for malignant neoplasm of colon [Z12.11])    Surgeons: Johnathan Irvin MD Provider: Robin Workman MD    Anesthesia Type: general ASA Status: 3            Anesthesia Type: general    Vitals  Vitals Value Taken Time   /65 09/18/23 1138   Temp 98.1 °F (36.7 °C) 09/18/23 1118   Pulse 76 09/18/23 1138   Resp 18 09/18/23 1138   SpO2 97 % 09/18/23 1138           Post Anesthesia Care and Evaluation    Patient location during evaluation: PHASE II  Patient participation: complete - patient participated  Level of consciousness: awake and alert  Pain score: 0  Pain management: adequate    Airway patency: patent  Anesthetic complications: No anesthetic complications    Cardiovascular status: acceptable  Respiratory status: acceptable  Hydration status: acceptable

## 2023-09-18 NOTE — H&P
GASTROENTEROLOGY OFFICE NOTE  Mendez Deluna  [id number]  1970      CHIEF COMPLAINT  Routine colon cancer screening    HISTORY OF PRESENT ILLNESS:  First time meeting this very pleasant 52-year-old white male who presents for routine colon cancer screening in the absence of any family history of colon cancer/polyps or in the absence of any localizing GI complaint specifically denying any particular problems with dysphagia, odynophagia, early satiety, unexplained weight loss, melena, bright red blood per rectum, constipation or diarrhea.    PAST MEDICAL HISTORY  Past Medical History:    Acquired trigger finger    Back pain    Chest pain    Elevated cholesterol    Hypercholesterolemia    Kidney stones    Type 1 diabetes mellitus        PAST SURGICAL HISTORY  Past Surgical History:    COLONOSCOPY    WISDOM TOOTH EXTRACTION        MEDICATIONS:  Prior to Admission medications    Medication Sig Start Date End Date Taking? Authorizing Provider   B-D ULTRAFINE III SHORT PEN 31G X 8 MM misc USE FOR INSULIN INJECTIONS 4 TIMES DAILY 6/1/22  Yes Everardo Troncoso MD   Continuous Blood Gluc Sensor (Dexcom G6 Sensor) Every 10 (Ten) Days. 4/18/23  Yes Everardo Troncoso MD   Continuous Blood Gluc Sensor (FreeStyle Ronnie 3 Sensor) misc 1 each Every 14 (Fourteen) Days. 10/17/22  Yes Everardo Troncoso MD   Continuous Blood Gluc Transmit (Dexcom G6 Transmitter) misc 1 kit Every 3 (Three) Months. 4/18/23  Yes Everardo Troncoso MD   furosemide (LASIX) 40 MG tablet Take 1 tablet by mouth Daily. 7/28/23  Yes Magalis Malcolm MD   glucose blood (OneTouch Verio) test strip CHECK BLOOD SUGAR 4 TIMES PER DAY. 10/28/22  Yes Everardo Troncoso MD   Insulin Disposable Pump (Omnipod 5 G6 Intro, Gen 5,) kit 1 kit Take As Directed. 4/18/23  Yes Everardo Troncoso MD   Insulin Disposable Pump (Omnipod 5 G6 Pod, Gen 5,) misc 1 each Every Other Day. 6/16/23  Yes Everardo Troncoso MD   Insulin  "Lispro-aabc (Lyumjev KwikPen) 200 UNIT/ML solution pen-injector Inject 300 Units under the skin into the appropriate area as directed Daily. 7/25/23  Yes Everardo Troncoso MD   lisinopril (PRINIVIL,ZESTRIL) 10 MG tablet Take 1 tablet by mouth Daily. 7/28/23  Yes Magalis Malcolm MD   ofloxacin (OCUFLOX) 0.3 % ophthalmic solution Administer 1 drop into the left eye 4 (Four) Times a Day. 8/4/23  Yes Magalis Malcolm MD   oxyCODONE-acetaminophen (PERCOCET) 5-325 MG per tablet Take 1 tablet by mouth Every 6 (Six) Hours As Needed for Severe Pain. 9/9/23  Yes Ronak Zelaya MD   rosuvastatin (CRESTOR) 40 MG tablet Take 1 tablet by mouth Daily. 4/19/23 4/18/24 Yes Everardo Troncoso MD   tamsulosin (FLOMAX) 0.4 MG capsule 24 hr capsule TAKE 1 CAPSULE BY MOUTH DAILY 9/14/23  Yes Chinmay Lynne PA-C Toujeo Max SoloStar 300 UNIT/ML solution pen-injector injection ADMINISTER 30 UNITS UNDER THE SKIN TWICE DAILY AS DIRECTED 7/31/23  Yes Everardo Troncoso MD   pantoprazole (PROTONIX) 40 MG EC tablet TAKE 1 TABLET BY MOUTH DAILY. 3/28/23   Magalis Malcolm MD   promethazine (PHENERGAN) 25 MG tablet Take 1 tablet by mouth Every 6 (Six) Hours As Needed for Nausea or Vomiting. 9/9/23   Ronak Zelaya MD        ALLERGIES  has No Known Allergies.    FAMILY HISTORY:  Cancer-related family history includes Cancer in his mother; Thyroid cancer in his mother.  Colon Cancer-related family history is not on file.    SOCIAL HISTORY  He  reports that he has never smoked. He has never used smokeless tobacco. He reports current alcohol use. He reports that he does not use drugs.   He is a \"between jobs \".  Single.  No children.  Non-smoker nondrinker.    REVIEW OF SYSTEMS  Cardiovascular, pulmonary generalized and GI review of systems are pertinent as reviewed above    PHYSICAL EXAM   /68 (BP Location: Right arm, Patient Position: Lying)   Pulse 84   Temp 98.5 °F (36.9 °C) (Oral)  " " Resp 18   Ht 174 cm (68.5\")   Wt 116 kg (255 lb)   SpO2 97%   BMI 38.21 kg/m²   General: Alert and oriented x 3. In no apparent or acute distress.  and No stigmata of chronic liver disease  HEENT: Anicteric sclerae. Normal oropharynx  Neck: Supple. Without lymphadenopathy  CV: Regular rate and rhythm, S1, S2  Lungs: Clear to ausculation. Without rales, rhonchi and wheezing  Abdomen:  Soft,non-distended without palpable masses or hepatosplenomeagaly, areas of rebound tenderness or guarding.   Extremeties: without clubbing, cyanosis or edema  Neurologic:  Alert and oriented x 3 without focal motor or sensory deficits  Rectal exam: deferred         ASSESSMENT  1.-Patient at average risk for colon cancer presents for routine colon cancer screening  2.-Other medical problems are as listed above    PLAN  1.-Screening colonoscopy.  I explained to him the risk of colonic perforation, bleeding, cardiorespiratory compromise and missed lesions.  Ample opportunity for questions has been provided.  He wishes to proceed with further recommendation deferred pending findings of today's colonoscopy      Johnathan Irvin MD  9/18/2023   10:33 EDT                  "

## 2023-09-29 ENCOUNTER — OFFICE VISIT (OUTPATIENT)
Dept: UROLOGY | Facility: CLINIC | Age: 53
End: 2023-09-29
Payer: MEDICAID

## 2023-09-29 VITALS
HEART RATE: 92 BPM | WEIGHT: 253.6 LBS | HEIGHT: 69 IN | DIASTOLIC BLOOD PRESSURE: 72 MMHG | BODY MASS INDEX: 37.56 KG/M2 | SYSTOLIC BLOOD PRESSURE: 135 MMHG

## 2023-09-29 DIAGNOSIS — N20.1 LEFT URETERAL STONE: Primary | ICD-10-CM

## 2023-09-29 LAB
BILIRUB BLD-MCNC: NEGATIVE MG/DL
CLARITY, POC: CLEAR
COLOR UR: YELLOW
EXPIRATION DATE: ABNORMAL
GLUCOSE UR STRIP-MCNC: NEGATIVE MG/DL
KETONES UR QL: NEGATIVE
LEUKOCYTE EST, POC: NEGATIVE
Lab: ABNORMAL
NITRITE UR-MCNC: NEGATIVE MG/ML
PH UR: 6 [PH] (ref 5–8)
PROT UR STRIP-MCNC: ABNORMAL MG/DL
RBC # UR STRIP: NEGATIVE /UL
SP GR UR: 1.01 (ref 1–1.03)
UROBILINOGEN UR QL: NORMAL

## 2023-09-29 NOTE — PROGRESS NOTES
"Chief Complaint:    Chief Complaint   Patient presents with    Left ureteral stone     Follow up       Vital Signs:   /72   Pulse 92   Ht 174 cm (68.5\")   Wt 115 kg (253 lb 9.6 oz)   BMI 37.99 kg/m²   Body mass index is 37.99 kg/m².      HPI:  Mendez Deluna is a 52 y.o. male who presents today for follow up    History of Present Illness  Mr. Deluna presents to the clinic for 2 to 3-week follow-up for nephrolithiasis.  He was initially seen at the ER here [facility] for a distal 2 to 3 mm left UVJ calculus causing some moderate obstructive uropathy.  Patient was desirous to pass the stone voluntarily using medications.  States that since last office visit he has had no new complaints.  Reports some intermittent flank pain for roughly 1 week however this is subsided.  He was unable to collect any stone to send off for analysis.  He denies any fever, chills, frequency, urgency, dysuria, gross hematuria, or back or flank pain.  He is doing well and most likely passed a stone.    Past Medical History:  Past Medical History:   Diagnosis Date    Acquired trigger finger     Back pain     Chest pain     Elevated cholesterol     Hypercholesterolemia     Kidney stones     Type 1 diabetes mellitus        Current Meds:  Current Outpatient Medications   Medication Sig Dispense Refill    B-D ULTRAFINE III SHORT PEN 31G X 8 MM misc USE FOR INSULIN INJECTIONS 4 TIMES DAILY 400 each 3    Continuous Blood Gluc Sensor (Dexcom G6 Sensor) Every 10 (Ten) Days. 9 each 3    Continuous Blood Gluc Sensor (FreeStyle Ronnie 3 Sensor) misc 1 each Every 14 (Fourteen) Days. 6 each 3    Continuous Blood Gluc Transmit (Dexcom G6 Transmitter) misc 1 kit Every 3 (Three) Months. 1 each 3    furosemide (LASIX) 40 MG tablet Take 1 tablet by mouth Daily. 90 tablet 1    glucose blood (OneTouch Verio) test strip CHECK BLOOD SUGAR 4 TIMES PER DAY. 400 each 3    Insulin Disposable Pump (Omnipod 5 G6 Intro, Gen 5,) kit 1 kit Take As " Directed. 1 kit 0    Insulin Disposable Pump (Omnipod 5 G6 Pod, Gen 5,) misc 1 each Every Other Day. 45 each 3    Insulin Lispro-aabc (Lyumjev KwikPen) 200 UNIT/ML solution pen-injector Inject 300 Units under the skin into the appropriate area as directed Daily. 270 mL 1    lisinopril (PRINIVIL,ZESTRIL) 10 MG tablet Take 1 tablet by mouth Daily. 90 tablet 1    ofloxacin (OCUFLOX) 0.3 % ophthalmic solution Administer 1 drop into the left eye 4 (Four) Times a Day. 10 mL 0    oxyCODONE-acetaminophen (PERCOCET) 5-325 MG per tablet Take 1 tablet by mouth Every 6 (Six) Hours As Needed for Severe Pain. 10 tablet 0    pantoprazole (PROTONIX) 40 MG EC tablet TAKE 1 TABLET BY MOUTH DAILY. 30 tablet 0    promethazine (PHENERGAN) 25 MG tablet Take 1 tablet by mouth Every 6 (Six) Hours As Needed for Nausea or Vomiting. 12 tablet 0    rosuvastatin (CRESTOR) 40 MG tablet Take 1 tablet by mouth Daily. 30 tablet 11    tamsulosin (FLOMAX) 0.4 MG capsule 24 hr capsule TAKE 1 CAPSULE BY MOUTH DAILY 90 capsule 3    Toujeo Max SoloStar 300 UNIT/ML solution pen-injector injection ADMINISTER 30 UNITS UNDER THE SKIN TWICE DAILY AS DIRECTED 6 mL 4     No current facility-administered medications for this visit.        Allergies:   No Known Allergies     Past Surgical History:  Past Surgical History:   Procedure Laterality Date    COLONOSCOPY      COLONOSCOPY N/A 9/18/2023    Procedure: COLONOSCOPY;  Surgeon: Johnathan Irvin MD;  Location: [facility];  Service: Gastroenterology;  Laterality: N/A;    WISDOM TOOTH EXTRACTION         Social History:  Social History     Socioeconomic History    Marital status: Other   Tobacco Use    Smoking status: Never    Smokeless tobacco: Never   Vaping Use    Vaping Use: Never used   Substance and Sexual Activity    Alcohol use: Yes     Comment: social    Drug use: Never    Sexual activity: Defer       Family History:  Family History   Problem Relation Age of Onset    Cancer Mother      Hyperlipidemia Mother     Thyroid cancer Mother     Thyroid disease Mother     Diabetes Father     Hyperlipidemia Father     Heart disease Father         Quad bypass       Review of Systems:  Review of Systems   Constitutional:  Negative for fatigue, fever and unexpected weight change.   Respiratory:  Negative for chest tightness and shortness of breath.    Cardiovascular:  Negative for chest pain.   Gastrointestinal:  Negative for abdominal pain, constipation, diarrhea, nausea and vomiting.   Genitourinary:  Negative for difficulty urinating, dysuria, flank pain, frequency, hematuria and urgency.   Musculoskeletal:  Negative for back pain.   Skin:  Negative for rash.   Psychiatric/Behavioral:  Negative for confusion and suicidal ideas.      Physical Exam:  Physical Exam  Constitutional:       General: He is not in acute distress.     Appearance: Normal appearance.   HENT:      Head: Normocephalic and atraumatic.      Nose: Nose normal.      Mouth/Throat:      Mouth: Mucous membranes are moist.   Eyes:      Conjunctiva/sclera: Conjunctivae normal.   Cardiovascular:      Rate and Rhythm: Normal rate and regular rhythm.      Pulses: Normal pulses.      Heart sounds: Normal heart sounds.   Pulmonary:      Effort: Pulmonary effort is normal.      Breath sounds: Normal breath sounds.   Abdominal:      General: Bowel sounds are normal.      Palpations: Abdomen is soft.   Musculoskeletal:         General: Normal range of motion.      Cervical back: Normal range of motion.   Skin:     General: Skin is warm.   Neurological:      General: No focal deficit present.      Mental Status: He is alert and oriented to person, place, and time.   Psychiatric:         Mood and Affect: Mood normal.         Behavior: Behavior normal.         Thought Content: Thought content normal.         Judgment: Judgment normal.         Recent Image (CT and/or KUB):   CT Abdomen and Pelvis: No results found for this or any previous visit.     CT  Stone Protocol: No results found for this or any previous visit.     KUB: No results found for this or any previous visit.       Labs:  Brief Urine Lab Results  (Last result in the past 365 days)        Color   Clarity   Blood   Leuk Est   Nitrite   Protein   CREAT   Urine HCG        09/29/23 1120 Yellow   Clear   Negative   Negative   Negative   Trace                 Office Visit on 09/29/2023   Component Date Value Ref Range Status    Color 09/29/2023 Yellow  Yellow, Straw, Dark Yellow, Shivani Final    Clarity, UA 09/29/2023 Clear  Clear Final    Specific Gravity  09/29/2023 1.010  1.005 - 1.030 Final    pH, Urine 09/29/2023 6.0  5.0 - 8.0 Final    Leukocytes 09/29/2023 Negative  Negative Final    Nitrite, UA 09/29/2023 Negative  Negative Final    Protein, POC 09/29/2023 Trace (A)  Negative mg/dL Final    Glucose, UA 09/29/2023 Negative  Negative mg/dL Final    Ketones, UA 09/29/2023 Negative  Negative Final    Urobilinogen, UA 09/29/2023 Normal  Normal, 0.2 E.U./dL Final    Bilirubin 09/29/2023 Negative  Negative Final    Blood, UA 09/29/2023 Negative  Negative Final    Lot Number 09/29/2023 [id number]   Final    Expiration Date 09/29/2023 10/25/2024   Final   Admission on 09/18/2023, Discharged on 09/18/2023   Component Date Value Ref Range Status    Glucose 09/18/2023 131 (A)  70 - 130 mg/dL Final    PER PT'S DEXCOM   Admission on 09/08/2023, Discharged on 09/09/2023   Component Date Value Ref Range Status    Color, UA 09/09/2023 Yellow  Yellow, Straw Final    Appearance, UA 09/09/2023 Cloudy (A)  Clear Final    pH, UA 09/09/2023 5.5  5.0 - 8.0 Final    Specific Gravity, UA 09/09/2023 1.015  1.005 - 1.030 Final    Glucose, UA 09/09/2023 Negative  Negative Final    Ketones, UA 09/09/2023 Trace (A)  Negative Final    Bilirubin, UA 09/09/2023 Negative  Negative Final    Blood, UA 09/09/2023 Large (3+) (A)  Negative Final    Protein, UA 09/09/2023 Negative  Negative Final    Leuk Esterase, UA 09/09/2023 Negative   Negative Final    Nitrite, UA 09/09/2023 Negative  Negative Final    Urobilinogen, UA 09/09/2023 0.2 E.U./dL  0.2 - 1.0 E.U./dL Final    Glucose 09/08/2023 163 (H)  65 - 99 mg/dL Final    BUN 09/08/2023 18  6 - 20 mg/dL Final    Creatinine 09/08/2023 1.31 (H)  0.76 - 1.27 mg/dL Final    Sodium 09/08/2023 137  136 - 145 mmol/L Final    Potassium 09/08/2023 4.2  3.5 - 5.2 mmol/L Final    1+ Hemolysis    Slight hemolysis detected by analyzer. Results may be affected.    Chloride 09/08/2023 103  98 - 107 mmol/L Final    CO2 09/08/2023 25.1  22.0 - 29.0 mmol/L Final    Calcium 09/08/2023 9.0  8.6 - 10.5 mg/dL Final    Total Protein 09/08/2023 6.8  6.0 - 8.5 g/dL Final    Albumin 09/08/2023 3.8  3.5 - 5.2 g/dL Final    ALT (SGPT) 09/08/2023 24  1 - 41 U/L Final    AST (SGOT) 09/08/2023 22  1 - 40 U/L Final    Alkaline Phosphatase 09/08/2023 46  39 - 117 U/L Final    Total Bilirubin 09/08/2023 0.3  0.0 - 1.2 mg/dL Final    Globulin 09/08/2023 3.0  gm/dL Final    A/G Ratio 09/08/2023 1.3  g/dL Final    BUN/Creatinine Ratio 09/08/2023 13.7  7.0 - 25.0 Final    Anion Gap 09/08/2023 8.9  5.0 - 15.0 mmol/L Final    eGFR 09/08/2023 64.7  >60.0 mL/min/1.73 Final    Lipase 09/08/2023 17  13 - 60 U/L Final    WBC 09/08/2023 10.47  3.40 - 10.80 10*3/mm3 Final    RBC 09/08/2023 4.75  4.14 - 5.80 10*6/mm3 Final    Hemoglobin 09/08/2023 14.4  13.0 - 17.7 g/dL Final    Hematocrit 09/08/2023 42.7  37.5 - 51.0 % Final    MCV 09/08/2023 89.9  79.0 - 97.0 fL Final    MCH 09/08/2023 30.3  26.6 - 33.0 pg Final    MCHC 09/08/2023 33.7  31.5 - 35.7 g/dL Final    RDW 09/08/2023 12.5  12.3 - 15.4 % Final    RDW-SD 09/08/2023 41.0  37.0 - 54.0 fl Final    MPV 09/08/2023 11.2  6.0 - 12.0 fL Final    Platelets 09/08/2023 212  140 - 450 10*3/mm3 Final    Neutrophil % 09/08/2023 81.0 (H)  42.7 - 76.0 % Final    Lymphocyte % 09/08/2023 12.1 (L)  19.6 - 45.3 % Final    Monocyte % 09/08/2023 6.5  5.0 - 12.0 % Final    Eosinophil % 09/08/2023 0.1 (L)   0.3 - 6.2 % Final    Basophil % 09/08/2023 0.1  0.0 - 1.5 % Final    Immature Grans % 09/08/2023 0.2  0.0 - 0.5 % Final    Neutrophils, Absolute 09/08/2023 8.48 (H)  1.70 - 7.00 10*3/mm3 Final    Lymphocytes, Absolute 09/08/2023 1.27  0.70 - 3.10 10*3/mm3 Final    Monocytes, Absolute 09/08/2023 0.68  0.10 - 0.90 10*3/mm3 Final    Eosinophils, Absolute 09/08/2023 0.01  0.00 - 0.40 10*3/mm3 Final    Basophils, Absolute 09/08/2023 0.01  0.00 - 0.20 10*3/mm3 Final    Immature Grans, Absolute 09/08/2023 0.02  0.00 - 0.05 10*3/mm3 Final    nRBC 09/08/2023 0.0  0.0 - 0.2 /100 WBC Final    RBC, UA 09/09/2023 13-20 (A)  None Seen, 0-2 /HPF Final    WBC, UA 09/09/2023 0-2  None Seen, 0-2 /HPF Final    Bacteria, UA 09/09/2023 None Seen  None Seen /HPF Final    Squamous Epithelial Cells, UA 09/09/2023 0-2  None Seen, 0-2 /HPF Final    Hyaline Casts, UA 09/09/2023 None Seen  None Seen /LPF Final    Methodology 09/09/2023 Automated Microscopy   Final   Office Visit on 07/28/2023   Component Date Value Ref Range Status    Creatine Kinase 07/28/2023 58  20 - 200 U/L Final    Uric Acid 07/28/2023 5.1  3.4 - 7.0 mg/dL Final    Magnesium 07/28/2023 2.2  1.6 - 2.6 mg/dL Final    TSH 07/28/2023 2.380  0.270 - 4.200 uIU/mL Final    25 Hydroxy, Vitamin D 07/28/2023 31.6  30.0 - 100.0 ng/ml Final    Comment: Reference Range for Total Vitamin D 25(OH)  Deficiency <20.0 ng/mL  Insufficiency 21-29 ng/mL  Sufficiency  ng/mL  Toxicity >100 ng/ml      Vitamin B-12 07/28/2023 369  211 - 946 pg/mL Final    Results may be falsely increased if patient taking Biotin.    Microalbumin, Urine 07/28/2023 8.3  Not Estab. ug/mL Final    Total Cholesterol 07/28/2023 157  0 - 200 mg/dL Final    Comment: Cholesterol Reference Ranges  (U.S. Department of Health and Human Services ATP III  Classifications)  Desirable          <200 mg/dL  Borderline High    200-239 mg/dL  High Risk          >240 mg/dL  Triglyceride Reference Ranges  (U.S. Department of  Health and Human Services ATP III  Classifications)  Normal           <150 mg/dL  Borderline High  150-199 mg/dL  High             200-499 mg/dL  Very High        >500 mg/dL  HDL Reference Ranges  (U.S. Department of Health and Human Services ATP III  Classifications)  Low     <40 mg/dl (major risk factor for CHD)  High    >60 mg/dl ('negative' risk factor for CHD)  LDL Reference Ranges  (U.S. Department of Health and Human Services ATP III  Classifications)  Optimal          <100 mg/dL  Near Optimal     100-129 mg/dL  Borderline High  130-159 mg/dL  High             160-189 mg/dL  Very High        >189 mg/dL      Triglycerides 07/28/2023 86  0 - 150 mg/dL Final    HDL Cholesterol 07/28/2023 40  40 - 60 mg/dL Final    VLDL Cholesterol Reg 07/28/2023 16  5 - 40 mg/dL Final    LDL Chol Calc (NIH) 07/28/2023 101 (H)  0 - 100 mg/dL Final    Hemoglobin A1C 07/28/2023 6.50 (H)  4.80 - 5.60 % Final    Comment: Hemoglobin A1C Ranges:  Increased Risk for Diabetes  5.7% to 6.4%  Diabetes                     >= 6.5%  Diabetic Goal                < 7.0%      Glucose 07/28/2023 91  65 - 99 mg/dL Final    BUN 07/28/2023 16  6 - 20 mg/dL Final    Creatinine 07/28/2023 0.86  0.76 - 1.27 mg/dL Final    EGFR Result 07/28/2023 102.9  >60.0 mL/min/1.73 Final    Comment: GFR Normal >60  Chronic Kidney Disease <60  Kidney Failure <15      BUN/Creatinine Ratio 07/28/2023 18.6  7.0 - 25.0 Final    Sodium 07/28/2023 140  136 - 145 mmol/L Final    Potassium 07/28/2023 4.2  3.5 - 5.2 mmol/L Final    Chloride 07/28/2023 104  98 - 107 mmol/L Final    Total CO2 07/28/2023 24.7  22.0 - 29.0 mmol/L Final    Calcium 07/28/2023 8.8  8.6 - 10.5 mg/dL Final    Total Protein 07/28/2023 6.2  6.0 - 8.5 g/dL Final    Albumin 07/28/2023 4.1  3.5 - 5.2 g/dL Final    Globulin 07/28/2023 2.1  gm/dL Final    A/G Ratio 07/28/2023 2.0  g/dL Final    Total Bilirubin 07/28/2023 0.4  0.0 - 1.2 mg/dL Final    Alkaline Phosphatase 07/28/2023 48  39 - 117 U/L Final     AST (SGOT) 07/28/2023 15  1 - 40 U/L Final    ALT (SGPT) 07/28/2023 22  1 - 41 U/L Final    PSA 07/28/2023 0.434  0.000 - 4.000 ng/mL Final    Comment: Testing Method: Roche [employer] Electrochemiluminescence  Immunoassay(ECLIA)  Values obtained with different assay methods or kits cannot  be used interchangeably.      Interpretation 07/28/2023 Note   Final    Supplemental report is available.        Procedure: None  Procedures     I have reviewed and agree with the above PMH, PSH, FMH, social history, medications, allergies, and labs.     Assessment/Plan:   Problem List Items Addressed This Visit          Genitourinary and Reproductive     Left ureteral stone - Primary    Relevant Orders    POC Urinalysis Dipstick, Automated (Completed)       Health Maintenance:   Health Maintenance Due   Topic Date Due    Hepatitis B (1 of 3 - 3-dose series) Never done    Pneumococcal Vaccine 0-64 (1 - PCV) Never done    TDAP/TD VACCINES (1 - Tdap) Never done    ZOSTER VACCINE (2 of 2) 02/12/2020    HEPATITIS C SCREENING  Never done    ANNUAL PHYSICAL  Never done    DIABETIC EYE EXAM  Never done    DIABETIC FOOT EXAM  03/16/2023    INFLUENZA VACCINE  08/01/2023    COVID-19 Vaccine (6 - 2023-24 season) 09/01/2023        Smoking Counseling: Never smoked or use smokeless tobacco    Urine Incontinence: Patient reports that he is not currently experiencing any symptoms of urinary incontinence.    Patient was given instructions and counseling regarding his condition or for health maintenance advice. Please see specific information pulled into the AVS if appropriate.    Patient Education:   Nephrolithiasis -patient had a previous 2 to 3 mm left UVJ stone causing mild obstructive uropathy.  He was placed on Flomax and given appropriate pain medication.  He has had no lower urinary tract symptoms since last office visit.  He believes he has passed the stone.  I did discuss the use of repeat x-ray today in office however patient wishes to  hold off at this time.  Advised him that if symptoms return or he has any concerns to call and I will place a KUB order for him to have completed as soon as possible.  Otherwise we will see him back on an as-needed basis.    Visit Diagnoses:    ICD-10-CM ICD-9-CM   1. Left ureteral stone  N20.1 592.1       Meds Ordered During Visit:  No orders of the defined types were placed in this encounter.      Follow Up Appointment: As needed  No follow-ups on file.      This document has been electronically signed by Chinmay Lynne PA-C   September 29, 2023 11:55 EDT    Part of this note may be an electronic transcription/translation of spoken language to printed text using the Dragon Dictation System.

## 2023-10-20 RX ORDER — INSULIN LISPRO-AABC 200 [IU]/ML
300 INJECTION, SOLUTION SUBCUTANEOUS DAILY
Qty: 270 ML | Refills: 1 | Status: SHIPPED | OUTPATIENT
Start: 2023-10-20

## 2023-10-20 NOTE — TELEPHONE ENCOUNTER
Rx Refill Note  Requested Prescriptions     Pending Prescriptions Disp Refills    Lyumjev KwikPen 200 UNIT/ML solution pen-injector [Pharmacy Med Name: LYUMJEV KWIKPEN 200U/ML L-AABC 3ML] 270 mL 1     Sig: INJECT 300 UNITS UNDER THE SKIN INTO THE APPROPRIATE AREA AS DIRECTED DAILY.      Last office visit with prescribing clinician: 4/18/2023   Last telemedicine visit with prescribing clinician: Visit date not found   Next office visit with prescribing clinician: 10/23/2023                         Would you like a call back once the refill request has been completed: [] Yes [] No    If the office needs to give you a call back, can they leave a voicemail: [] Yes [] No    Klaudia Funez MA  10/20/23, 15:59 EDT

## 2023-10-21 DIAGNOSIS — R60.0 LOCALIZED EDEMA: ICD-10-CM

## 2023-10-23 ENCOUNTER — OFFICE VISIT (OUTPATIENT)
Dept: ENDOCRINOLOGY | Facility: CLINIC | Age: 53
End: 2023-10-23
Payer: MEDICAID

## 2023-10-23 VITALS
OXYGEN SATURATION: 95 % | DIASTOLIC BLOOD PRESSURE: 76 MMHG | WEIGHT: 257 LBS | BODY MASS INDEX: 38.95 KG/M2 | HEART RATE: 78 BPM | HEIGHT: 68 IN | SYSTOLIC BLOOD PRESSURE: 120 MMHG

## 2023-10-23 DIAGNOSIS — E10.649 UNCONTROLLED TYPE 1 DIABETES MELLITUS WITH HYPOGLYCEMIA WITHOUT COMA: Primary | ICD-10-CM

## 2023-10-23 LAB
EXPIRATION DATE: ABNORMAL
EXPIRATION DATE: ABNORMAL
GLUCOSE BLDC GLUCOMTR-MCNC: 141 MG/DL (ref 70–130)
HBA1C MFR BLD: 6.1 % (ref 4.5–5.7)
Lab: ABNORMAL
Lab: ABNORMAL

## 2023-10-23 PROCEDURE — 1159F MED LIST DOCD IN RCRD: CPT | Performed by: INTERNAL MEDICINE

## 2023-10-23 PROCEDURE — 3044F HG A1C LEVEL LT 7.0%: CPT | Performed by: INTERNAL MEDICINE

## 2023-10-23 PROCEDURE — 83036 HEMOGLOBIN GLYCOSYLATED A1C: CPT | Performed by: INTERNAL MEDICINE

## 2023-10-23 PROCEDURE — 1160F RVW MEDS BY RX/DR IN RCRD: CPT | Performed by: INTERNAL MEDICINE

## 2023-10-23 PROCEDURE — 99213 OFFICE O/P EST LOW 20 MIN: CPT | Performed by: INTERNAL MEDICINE

## 2023-10-23 PROCEDURE — 82947 ASSAY GLUCOSE BLOOD QUANT: CPT | Performed by: INTERNAL MEDICINE

## 2023-10-23 RX ORDER — FUROSEMIDE 40 MG/1
40 TABLET ORAL DAILY
Qty: 90 TABLET | Refills: 1 | OUTPATIENT
Start: 2023-10-23

## 2023-10-23 NOTE — PROGRESS NOTES
"     Office Note      Date: 10/23/2023  Patient Name: Mendez Deluna  MRN: [id number]  : 1970    Chief Complaint   Patient presents with    Diabetes       History of Present Illness:   Mendez Deluna is a 52 y.o. male who presents for Diabetes type 1.   Current RX insulin in an op5  with dexcom   Bg is checked 288 times per day with dexcom.  Insulin doses are adjusted frequently based upon the readings.  Patient has occasional hypoglycemia.  Patient has been using the system during the last 3 months.           Last A1c:  Hemoglobin A1C   Date Value Ref Range Status   10/23/2023 6.1 (A) 4.5 - 5.7 % Final   2022 6.60 (H) 4.80 - 5.60 % Final       Changes in health since last visit: none . Last eye exam up to date.    Subjective              Review of Systems:   Review of Systems   Constitutional: Negative.    HENT: Negative.     Eyes: Negative.    Respiratory: Negative.         The following portions of the patient's history were reviewed and updated as appropriate: allergies, current medications, past family history, past medical history, past social history, past surgical history, and problem list.    Objective     Visit Vitals  /76   Pulse 78   Ht 172.7 cm (68\")   Wt 117 kg (257 lb)   SpO2 95%   BMI 39.08 kg/m²           Physical Exam:  Physical Exam  Vitals reviewed.   Constitutional:       Appearance: Normal appearance.   Neurological:      Mental Status: He is alert.   Psychiatric:         Mood and Affect: Mood normal.         Behavior: Behavior normal.         Thought Content: Thought content normal.         Judgment: Judgment normal.          Assessment / Plan      Assessment & Plan:  Problem List Items Addressed This Visit          Other    Uncontrolled type 1 diabetes mellitus with hypoglycemia without coma - Primary    Overview     Since age 11         Current Assessment & Plan      A1c at goal  No changes are needed          Relevant Medications    Sherrie Ag SoloStar 300 UNIT/ML solution " pen-injector injection    Margaret Cerda 200 UNIT/ML solution pen-injector    Other Relevant Orders    POC Glucose, Blood (Completed)    POC Glycosylated Hemoglobin (Hb A1C) (Completed)        Everardo Troncoso MD   10/23/2023

## 2023-11-14 RX ORDER — BLOOD-GLUCOSE METER
EACH MISCELLANEOUS
Qty: 400 EACH | Refills: 3 | Status: SHIPPED | OUTPATIENT
Start: 2023-11-14

## 2023-11-14 NOTE — TELEPHONE ENCOUNTER
Rx Refill Note  Requested Prescriptions     Pending Prescriptions Disp Refills    glucose blood (OneTouch Verio) test strip 400 each 3     Sig: CHECK BLOOD SUGAR 4 TIMES PER DAY.      Last office visit with prescribing clinician: 10/23/2023     Next office visit with prescribing clinician: 4/24/2024       Seth Dumas MA  11/14/23, 13:09 EST

## 2024-02-14 RX ORDER — ROSUVASTATIN CALCIUM 40 MG/1
40 TABLET, COATED ORAL DAILY
Qty: 30 TABLET | Refills: 11 | Status: SHIPPED | OUTPATIENT
Start: 2024-02-14 | End: 2025-02-13

## 2024-04-15 RX ORDER — PROCHLORPERAZINE 25 MG/1
SUPPOSITORY RECTAL
Qty: 9 EACH | Refills: 3 | Status: SHIPPED | OUTPATIENT
Start: 2024-04-15

## 2024-04-15 RX ORDER — PROCHLORPERAZINE 25 MG/1
SUPPOSITORY RECTAL
Qty: 1 EACH | Refills: 3 | Status: SHIPPED | OUTPATIENT
Start: 2024-04-15

## 2024-04-24 ENCOUNTER — OFFICE VISIT (OUTPATIENT)
Dept: ENDOCRINOLOGY | Facility: CLINIC | Age: 54
End: 2024-04-24
Payer: MEDICAID

## 2024-04-24 VITALS
DIASTOLIC BLOOD PRESSURE: 62 MMHG | SYSTOLIC BLOOD PRESSURE: 120 MMHG | OXYGEN SATURATION: 97 % | BODY MASS INDEX: 40.32 KG/M2 | HEIGHT: 68 IN | WEIGHT: 266 LBS | HEART RATE: 93 BPM

## 2024-04-24 DIAGNOSIS — E10.649 UNCONTROLLED TYPE 1 DIABETES MELLITUS WITH HYPOGLYCEMIA WITHOUT COMA: Primary | ICD-10-CM

## 2024-04-24 LAB
EXPIRATION DATE: ABNORMAL
EXPIRATION DATE: NORMAL
GLUCOSE BLDC GLUCOMTR-MCNC: 179 MG/DL (ref 70–130)
HBA1C MFR BLD: 5.6 % (ref 4.5–5.7)
Lab: ABNORMAL
Lab: NORMAL

## 2024-04-24 NOTE — PROGRESS NOTES
"     Office Note      Date: 2024  Patient Name: Mendez Deluna  MRN: [id number]  : 1970    Chief Complaint   Patient presents with    Diabetes     Uncontrolled type 1 diabetes mellitus with hypoglycemia without coma       History of Present Illness:   Mendez Deluna is a 53 y.o. male who presents for Diabetes type 1.   Current RX INSULIN IN AN OP5 WITH G6  Bg is checked 288 times per day with dexcom.  Insulin doses are adjusted frequently based upon the readings.  Patient has occasional hypoglycemia.  Patient has been using the system during the last 3 months.       Last A1c:  Hemoglobin A1C   Date Value Ref Range Status   2024 5.6 4.5 - 5.7 % Final   2022 6.60 (H) 4.80 - 5.60 % Final       Changes in health since last visit: NONE . Last eye exam  DUE .    Subjective            Review of Systems:   Review of Systems   Endocrine: Negative for polydipsia and polyuria.       The following portions of the patient's history were reviewed and updated as appropriate: allergies, current medications, past family history, past medical history, past social history, past surgical history, and problem list.    Objective     Visit Vitals  /62 (BP Location: Left arm, Patient Position: Sitting, Cuff Size: Adult)   Pulse 93   Ht 172.7 cm (68\")   Wt 121 kg (266 lb)   SpO2 97%   BMI 40.45 kg/m²           Physical Exam:  Physical Exam  Vitals reviewed.   Constitutional:       Appearance: Normal appearance.   Neurological:      Mental Status: He is alert.   Psychiatric:         Mood and Affect: Mood normal.         Behavior: Behavior normal.         Thought Content: Thought content normal.         Judgment: Judgment normal.          Assessment / Plan      Assessment & Plan:  Problem List Items Addressed This Visit       Uncontrolled type 1 diabetes mellitus with hypoglycemia without coma - Primary    Overview     Since age 11         Current Assessment & Plan     A1C AT GOAL  NO CHANGES ARE NEEDED. A1C <6 " WITHOUT SEVERE LOWS          Relevant Medications    Toulove Ag SoloStar 300 UNIT/ML solution pen-injector injection    Lyumricky KwikPen 200 UNIT/ML solution pen-injector    Other Relevant Orders    POC Glycosylated Hemoglobin (Hb A1C) (Completed)    POC Glucose, Blood (Completed)         Electronically signed by : Everardo Troncoso MD  04/24/2024

## 2024-04-26 DIAGNOSIS — R60.0 LOCALIZED EDEMA: ICD-10-CM

## 2024-04-30 RX ORDER — FUROSEMIDE 40 MG/1
40 TABLET ORAL DAILY
Qty: 90 TABLET | Refills: 1 | OUTPATIENT
Start: 2024-04-30

## 2024-05-02 ENCOUNTER — OFFICE VISIT (OUTPATIENT)
Dept: FAMILY MEDICINE CLINIC | Facility: CLINIC | Age: 54
End: 2024-05-02
Payer: MEDICAID

## 2024-05-02 VITALS
SYSTOLIC BLOOD PRESSURE: 138 MMHG | BODY MASS INDEX: 40.56 KG/M2 | TEMPERATURE: 97.5 F | HEIGHT: 68 IN | OXYGEN SATURATION: 95 % | HEART RATE: 90 BPM | WEIGHT: 267.6 LBS | DIASTOLIC BLOOD PRESSURE: 74 MMHG

## 2024-05-02 DIAGNOSIS — R60.0 LOCALIZED EDEMA: ICD-10-CM

## 2024-05-02 DIAGNOSIS — M54.9 CHRONIC BILATERAL BACK PAIN, UNSPECIFIED BACK LOCATION: Primary | ICD-10-CM

## 2024-05-02 DIAGNOSIS — E11.69 HYPERLIPIDEMIA ASSOCIATED WITH TYPE 2 DIABETES MELLITUS: ICD-10-CM

## 2024-05-02 DIAGNOSIS — G56.03 BILATERAL CARPAL TUNNEL SYNDROME: ICD-10-CM

## 2024-05-02 DIAGNOSIS — I15.2 HYPERTENSION ASSOCIATED WITH DIABETES: ICD-10-CM

## 2024-05-02 DIAGNOSIS — G89.29 CHRONIC BILATERAL BACK PAIN, UNSPECIFIED BACK LOCATION: Primary | ICD-10-CM

## 2024-05-02 DIAGNOSIS — K21.9 GASTROESOPHAGEAL REFLUX DISEASE WITHOUT ESOPHAGITIS: ICD-10-CM

## 2024-05-02 DIAGNOSIS — G62.9 NEUROPATHY: ICD-10-CM

## 2024-05-02 DIAGNOSIS — E11.59 HYPERTENSION ASSOCIATED WITH DIABETES: ICD-10-CM

## 2024-05-02 DIAGNOSIS — E78.5 HYPERLIPIDEMIA ASSOCIATED WITH TYPE 2 DIABETES MELLITUS: ICD-10-CM

## 2024-05-02 PROCEDURE — 1126F AMNT PAIN NOTED NONE PRSNT: CPT | Performed by: FAMILY MEDICINE

## 2024-05-02 PROCEDURE — 3044F HG A1C LEVEL LT 7.0%: CPT | Performed by: FAMILY MEDICINE

## 2024-05-02 PROCEDURE — 1160F RVW MEDS BY RX/DR IN RCRD: CPT | Performed by: FAMILY MEDICINE

## 2024-05-02 PROCEDURE — 99214 OFFICE O/P EST MOD 30 MIN: CPT | Performed by: FAMILY MEDICINE

## 2024-05-02 PROCEDURE — 1159F MED LIST DOCD IN RCRD: CPT | Performed by: FAMILY MEDICINE

## 2024-05-02 RX ORDER — ROSUVASTATIN CALCIUM 40 MG/1
40 TABLET, COATED ORAL DAILY
Qty: 90 TABLET | Refills: 1 | Status: SHIPPED | OUTPATIENT
Start: 2024-05-02

## 2024-05-02 RX ORDER — FUROSEMIDE 40 MG/1
40 TABLET ORAL DAILY
Qty: 90 TABLET | Refills: 1 | Status: SHIPPED | OUTPATIENT
Start: 2024-05-02

## 2024-05-02 RX ORDER — CYCLOBENZAPRINE HCL 5 MG
5 TABLET ORAL 2 TIMES DAILY PRN
Qty: 60 TABLET | Refills: 0 | Status: SHIPPED | OUTPATIENT
Start: 2024-05-02

## 2024-05-02 RX ORDER — PANTOPRAZOLE SODIUM 40 MG/1
40 TABLET, DELAYED RELEASE ORAL DAILY
Qty: 90 TABLET | Refills: 1 | Status: SHIPPED | OUTPATIENT
Start: 2024-05-02

## 2024-05-02 RX ORDER — LISINOPRIL 10 MG/1
10 TABLET ORAL DAILY
Qty: 90 TABLET | Refills: 1 | Status: SHIPPED | OUTPATIENT
Start: 2024-05-02

## 2024-05-02 NOTE — PROGRESS NOTES
"Mendez Deluna     VITALS: Blood pressure 138/74, pulse 90, temperature 97.5 °F (36.4 °C), temperature source Temporal, height 172.7 cm (68\"), weight 121 kg (267 lb 9.6 oz), SpO2 95%.    Subjective  Chief Complaint  Back Pain (Lower back), Joint Pain (Left hand), and Lack of flexability    Subjective          History of Present Illness:  Patient is a 53 y.o.  male with medical conditions significant for  type 1 diabetes mellitus, GERD, and hyperlipidemia who presents to clinic for medical follow-up.    The patient had an episode of hyperglycemia upon awakening, with a reading of approximately 230 mg/dL. Despite administering insulin, his blood glucose remained elevated. He utilizes Dexcom for glucose monitoring, which is the only effective device for his current Omnipod 5 mg. He also uses a quick pen. He recently had a consultation with Dr. Troncoso, who manages his insulin prescription. He occasionally uses Toujeo when his pump does not work.    The patient is experiencing regurgitation and pain after consuming certain foods, approximately 1.5 to 2 years ago. These symptoms resolved upon discontinuation of Protonix but have since recurred. He requested a refill of Protonix.    The patient has experienced an exacerbation of his back pain, which impairs his ability to put on socks. Certain exercises, including hiking, exacerbate his back pain. He has not sought chiropractic care but has found massage to be beneficial. He has previously undergone physical therapy and received injections for his back.    The patient is experiencing unusual pains on the lateral and anterior aspects of his leg, which he describes as \"no nerves floating around on the inside of blood vessels.\" The pain is not constant and resolves spontaneously. He also suffers from sciatica and needs to engage in yoga, but his flexibility is limited due to his overweight status. The patient has a history of sciatica.    The patient reported recurrent injuries " to his big toe, particularly during exercises involving his foot. He also reported nocturnal pain and swelling in his big knuckles. He tends to sleep on his left side. He has a history of bilateral carpal tunnel syndrome since he was 18 years old and underwent an EMG prior to the pandemic. He also had trigger finger in his 20s-year-old, which resolved naturally.    The patient underwent a stress test under the care of a cardiologist, Dr. Anne, in [place], over 2 years ago. He was informed that his results were normal and was advised to follow up in 2 years. However, due to the passing of his cardiologist and the pandemic, he is requesting a referral to a cardiologist. The patient has a history of de Quervain's tenosynovitis in 08/2023.    He needs a refill on Lasix and some of his medications. He is not on Flomax. He has one more pill of Crestor left. Flomax was prescribed for kidney stones.    No complaints about any of the medications.    The following portions of the patient's history were reviewed and updated as appropriate: allergies, current medications, past family history, past medical history, past social history, past surgical history and problem list.    Past Medical History  Past Medical History:   Diagnosis Date    Acquired trigger finger     Back pain     Chest pain     Elevated cholesterol     Hypercholesterolemia     Kidney stones     Type 1 diabetes mellitus        Surgical History  Past Surgical History:   Procedure Laterality Date    COLONOSCOPY      COLONOSCOPY N/A 9/18/2023    Procedure: COLONOSCOPY;  Surgeon: Johnathan Irvin MD;  Location: [facility];  Service: Gastroenterology;  Laterality: N/A;    WISDOM TOOTH EXTRACTION         Family History  Family History   Problem Relation Age of Onset    Cancer Mother     Hyperlipidemia Mother     Thyroid cancer Mother     Thyroid disease Mother     Diabetes Father     Hyperlipidemia Father     Heart disease Father         Quad bypass  "      Social History  Social History     Socioeconomic History    Marital status: Other   Tobacco Use    Smoking status: Never    Smokeless tobacco: Never   Vaping Use    Vaping status: Never Used   Substance and Sexual Activity    Alcohol use: Yes     Comment: social    Drug use: Never    Sexual activity: Defer       Objective   Vital Signs:   /74 (BP Location: Right arm, Patient Position: Sitting, Cuff Size: Adult)   Pulse 90   Temp 97.5 °F (36.4 °C) (Temporal)   Ht 172.7 cm (68\")   Wt 121 kg (267 lb 9.6 oz)   SpO2 95%   BMI 40.69 kg/m²     Physical Exam     Gen: Patient in NAD. Pleasant and answers appropriately. A&Ox3.    Skin: Warm and dry with normal turgor. No purpura, rashes, or unusual pigmentation noted. Hair is normal in appearance and distribution.    HEENT: NC/AT. No lesions noted. Conjunctiva clear, sclera nonicteric. PERRL. EOMI without nystagmus or strabismus. Fundi appear benign. No hemorrhages or exudates of eyes. Auditory canals are patent bilaterally without lesions. TMs intact,  nonerythematous, bulging without lesions. Nasal mucosa pink, nonerythematous, and nonedematous. Frontal and maxillary sinuses are nontender. O/P erythematous and moist without exudate.    Neck: Supple without lymph nodes palpated. FROM.     Lungs: Decreased B/L without rales, rhonchi, crackles, or wheezes.    Heart: RRR. S1 and S2 normal. No S3 or S4. No MRGT.    Abd: Soft, nontender,nondistended. (+)BSx4 quadrants.     Extrem: No CC.  Trace edema bilateral lower extremities.  Radial pulses 2+/4 and equal B/L. FROMx4.  Positive joint tenderness noted.    Back: Decreased range of motion.    Neuro: No focal motor/sensory deficits.    Procedures    Result Review :   The following data was reviewed by: aMgalis Malcolm MD on 05/02/2024:                Assessment and Plan    Mendez Deluna is a 53 y.o. here for medical followup.    Diagnoses and all orders for this visit:    1. Chronic bilateral back pain, " unspecified back location (Primary)  -     cyclobenzaprine (FLEXERIL) 5 MG tablet; Take 1 tablet by mouth 2 (Two) Times a Day As Needed for Muscle Spasms.  Dispense: 60 tablet; Refill: 0  -     Ambulatory Referral to Physical Therapy  -     XR Spine Cervical 2 View  -     XR Spine Thoracic 2 View  -     XR Spine Lumbar 2 or 3 View  -     C-reactive Protein  -     Comprehensive Metabolic Panel  -     Magnesium  -     Vitamin B12  -     Vitamin D,25-Hydroxy  -     Sedimentation Rate    2. Hypertension associated with diabetes  -     lisinopril (PRINIVIL,ZESTRIL) 10 MG tablet; Take 1 tablet by mouth Daily.  Dispense: 90 tablet; Refill: 1  -     Cancel: Comprehensive Metabolic Panel; Future    3. Localized edema  Comments:  The patient's leg pain may be attributed to electrolyte imbalances due to Lasix.  Orders:  -     furosemide (LASIX) 40 MG tablet; Take 1 tablet by mouth Daily.  Dispense: 90 tablet; Refill: 1  -     Cancel: Comprehensive Metabolic Panel; Future    4. Gastroesophageal reflux disease without esophagitis  Comments:  The patient is advised to resume Protonix as needed.  Orders:  -     pantoprazole (PROTONIX) 40 MG EC tablet; Take 1 tablet by mouth Daily.  Dispense: 90 tablet; Refill: 1  -     Cancel: Comprehensive Metabolic Panel; Future    5. Hyperlipidemia associated with type 2 diabetes mellitus  -     rosuvastatin (CRESTOR) 40 MG tablet; Take 1 tablet by mouth Daily.  Dispense: 90 tablet; Refill: 1  -     Cancel: Comprehensive Metabolic Panel; Future    6. Neuropathy  -     Cancel: Comprehensive Metabolic Panel; Future  -     Ambulatory Referral to Physical Therapy  -     Cancel: Vitamin D,25-Hydroxy; Future  -     Cancel: Vitamin B12; Future  -     Cancel: Magnesium; Future  -     EMG & Nerve Conduction Test; Future  -     Cancel: Sedimentation Rate; Future  -     Cancel: C-reactive Protein; Future    7. Bilateral carpal tunnel syndrome  Comments:  An Electromyography will be ordered.  Orders:  -      Cancel: Comprehensive Metabolic Panel; Future  -     Ambulatory Referral to Physical Therapy          Problem List Items Addressed This Visit    None  Visit Diagnoses       Chronic bilateral back pain, unspecified back location    -  Primary    Relevant Medications    cyclobenzaprine (FLEXERIL) 5 MG tablet    Other Relevant Orders    Ambulatory Referral to Physical Therapy (Completed)    XR Spine Cervical 2 View    XR Spine Thoracic 2 View    XR Spine Lumbar 2 or 3 View    C-reactive Protein    Comprehensive Metabolic Panel    Magnesium    Vitamin B12    Vitamin D,25-Hydroxy    Sedimentation Rate    Hypertension associated with diabetes        Relevant Medications    lisinopril (PRINIVIL,ZESTRIL) 10 MG tablet    furosemide (LASIX) 40 MG tablet    Localized edema        Relevant Medications    furosemide (LASIX) 40 MG tablet    Gastroesophageal reflux disease without esophagitis        Relevant Medications    pantoprazole (PROTONIX) 40 MG EC tablet    Hyperlipidemia associated with type 2 diabetes mellitus        Relevant Medications    rosuvastatin (CRESTOR) 40 MG tablet    Neuropathy        Relevant Orders    Ambulatory Referral to Physical Therapy (Completed)    EMG & Nerve Conduction Test    Bilateral carpal tunnel syndrome        Relevant Orders    Ambulatory Referral to Physical Therapy (Completed)          1. Type 1 Diabetes Mellitus.  The patient's hemoglobin A1c level is currently at 5.6 percent. Prescriptions for Lasix, Crestor, and Omnipod have been refilled. Liver, kidney function, and electrolyte levels will be checked today.    2. Back Pain.  Flexeril will be prescribed for the patient. He is instructed to take it at night to assess its effectiveness. If it does not induce drowsiness, it can be taken during the day. If drowsiness is experienced, he can take the morning dose with the evening dose and provide feedback. A referral for physical therapy at [facility] has been made.                Follow Up    Return in about 3 months (around 2024), or LABS, XRAY ([facility] cardiology).  Findings and plans discussed with patient who verbalizes understanding and agreement. Will followup with patient once results are in. Patient was given instructions and counseling regarding his condition or for health maintenance advice. Please see specific information pulled into the AVS if appropriate.       Magalis Malcolm MD      Transcribed from ambient dictation for Magalis Malcolm MD by Dorina Westfall.  24   12:03 EDT    Patient or patient representative verbalized consent to the visit recording.  I have personally performed the services described in this document as transcribed by the above individual, and it is both accurate and complete.      Answers submitted by the patient for this visit:  Primary Reason for Visit (Submitted on 2024)  What is the primary reason for your visit?: Other  Other (Submitted on 2024)  Please describe your symptoms.: * I have had lower back issues since my twenties. It has gotten somewhat better at times but has been bothering me quite a bit lately, and it affects my walking., * Carpal Tunnel Syndrome, * I experience inflammation in my hand joints, especially my left hand. Many nights, I awaken with pain in my joints, mostly the large joints, and especially of the ring and middle, and a little of the other fingers. The fingers don’t fully extend these days and strength in my left hand has diminish. I have  tenderness around the pulley area where the fingers attach to the hand, and I have had triggering in that ring finger, but not as much now. When the pain awakens me, I also have some numbness and it feels as if there is little circulation. If I shake off the pain and get the blood flowing, the pain dissipates., * My previous cardiologist wanted me to return to check something that wasn’t clear on a stress test, but he . And, then the pandemic  happened. So, I need to find a good cardiologist.  Have you had these symptoms before?: Yes  How long have you been having these symptoms?: Greater than 2 weeks

## 2024-05-03 LAB
25(OH)D3+25(OH)D2 SERPL-MCNC: 29.6 NG/ML (ref 30–100)
ALBUMIN SERPL-MCNC: 4.2 G/DL (ref 3.5–5.2)
ALBUMIN/GLOB SERPL: 1.6 G/DL
ALP SERPL-CCNC: 47 U/L (ref 39–117)
ALT SERPL-CCNC: 25 U/L (ref 1–41)
AST SERPL-CCNC: 22 U/L (ref 1–40)
BILIRUB SERPL-MCNC: 0.4 MG/DL (ref 0–1.2)
BUN SERPL-MCNC: 16 MG/DL (ref 6–20)
BUN/CREAT SERPL: 22.5 (ref 7–25)
CALCIUM SERPL-MCNC: 8.9 MG/DL (ref 8.6–10.5)
CHLORIDE SERPL-SCNC: 102 MMOL/L (ref 98–107)
CO2 SERPL-SCNC: 24.5 MMOL/L (ref 22–29)
CREAT SERPL-MCNC: 0.71 MG/DL (ref 0.76–1.27)
CRP SERPL-MCNC: <0.3 MG/DL (ref 0–0.5)
EGFRCR SERPLBLD CKD-EPI 2021: 108.4 ML/MIN/1.73
ERYTHROCYTE [SEDIMENTATION RATE] IN BLOOD BY WESTERGREN METHOD: 20 MM/HR (ref 0–20)
GLOBULIN SER CALC-MCNC: 2.7 GM/DL
GLUCOSE SERPL-MCNC: 106 MG/DL (ref 65–99)
MAGNESIUM SERPL-MCNC: 2.5 MG/DL (ref 1.6–2.6)
POTASSIUM SERPL-SCNC: 4.4 MMOL/L (ref 3.5–5.2)
PROT SERPL-MCNC: 6.9 G/DL (ref 6–8.5)
SODIUM SERPL-SCNC: 138 MMOL/L (ref 136–145)
VIT B12 SERPL-MCNC: 352 PG/ML (ref 211–946)

## 2024-07-05 RX ORDER — INSULIN PMP CART,AUT,G6/7,CNTR
EACH SUBCUTANEOUS
Qty: 45 EACH | Refills: 3 | Status: SHIPPED | OUTPATIENT
Start: 2024-07-05

## 2024-07-10 RX ORDER — INSULIN PMP CART,AUT,G6/7,CNTR
EACH SUBCUTANEOUS
Qty: 45 EACH | Refills: 3 | Status: CANCELLED | OUTPATIENT
Start: 2024-07-10

## 2024-07-10 RX ORDER — BLOOD SUGAR DIAGNOSTIC
STRIP MISCELLANEOUS
Qty: 400 EACH | Refills: 3 | Status: SHIPPED | OUTPATIENT
Start: 2024-07-10

## 2024-07-16 ENCOUNTER — OFFICE VISIT (OUTPATIENT)
Dept: FAMILY MEDICINE CLINIC | Facility: CLINIC | Age: 54
End: 2024-07-16
Payer: MEDICAID

## 2024-07-16 VITALS
HEIGHT: 68 IN | TEMPERATURE: 96.9 F | HEART RATE: 107 BPM | DIASTOLIC BLOOD PRESSURE: 58 MMHG | SYSTOLIC BLOOD PRESSURE: 136 MMHG | RESPIRATION RATE: 16 BRPM | WEIGHT: 273 LBS | OXYGEN SATURATION: 96 % | BODY MASS INDEX: 41.37 KG/M2

## 2024-07-16 DIAGNOSIS — G89.29 CHRONIC BILATERAL BACK PAIN, UNSPECIFIED BACK LOCATION: Primary | ICD-10-CM

## 2024-07-16 DIAGNOSIS — E78.5 HYPERLIPIDEMIA DUE TO TYPE 1 DIABETES MELLITUS: ICD-10-CM

## 2024-07-16 DIAGNOSIS — E10.69 HYPERLIPIDEMIA DUE TO TYPE 1 DIABETES MELLITUS: ICD-10-CM

## 2024-07-16 DIAGNOSIS — E10.649 UNCONTROLLED TYPE 1 DIABETES MELLITUS WITH HYPOGLYCEMIA WITHOUT COMA: ICD-10-CM

## 2024-07-16 DIAGNOSIS — M54.9 CHRONIC BILATERAL BACK PAIN, UNSPECIFIED BACK LOCATION: Primary | ICD-10-CM

## 2024-07-16 PROCEDURE — 3044F HG A1C LEVEL LT 7.0%: CPT | Performed by: FAMILY MEDICINE

## 2024-07-16 PROCEDURE — 1126F AMNT PAIN NOTED NONE PRSNT: CPT | Performed by: FAMILY MEDICINE

## 2024-07-16 PROCEDURE — 1160F RVW MEDS BY RX/DR IN RCRD: CPT | Performed by: FAMILY MEDICINE

## 2024-07-16 PROCEDURE — 1159F MED LIST DOCD IN RCRD: CPT | Performed by: FAMILY MEDICINE

## 2024-07-16 PROCEDURE — 99214 OFFICE O/P EST MOD 30 MIN: CPT | Performed by: FAMILY MEDICINE

## 2024-07-18 ENCOUNTER — TELEPHONE (OUTPATIENT)
Dept: FAMILY MEDICINE CLINIC | Facility: CLINIC | Age: 54
End: 2024-07-18
Payer: MEDICAID

## 2024-07-18 NOTE — TELEPHONE ENCOUNTER
Caller: Mendez Deluna    Relationship: Self    Best call back number: [phone]     What was the call regarding: PATIENT STATES THAT DR BLANCO WANTED HIM TO CALL BACK WITH THE NAME OF THE FACILITY THAT HE RECEIVED A TEST FOR CARPAL TUNNEL SYNDROME    [facility]UR[facility], [address] [place] [facility], [place] [id number]

## 2024-07-31 NOTE — PROGRESS NOTES
"Mendez Deluna     VITALS: Blood pressure 136/58, pulse 107, temperature 96.9 °F (36.1 °C), temperature source Temporal, resp. rate 16, height 172.7 cm (68\"), weight 124 kg (273 lb), SpO2 96%.    Subjective  Chief Complaint  Back Pain and Carpal Tunnel    Subjective          History of Present Illness:  Patient is a 53 y.o.  male with medical conditions significant for type 1 diabetes, hyperlipidemia, and back pain who presents to clinic secondary to medical followup.     Patient states that his back pain is worse since he has last been seen.  No new trauma or injury.  States that he has trouble mobilizing it and that it is snapping and popping more than usual.  Also complains that both of his hands are hurting him.  He does do a lot of computer time.  Utilizing exercises regarding carpal tunnel.    No complaints about any of the medications.    The following portions of the patient's history were reviewed and updated as appropriate: allergies, current medications, past family history, past medical history, past social history, past surgical history and problem list.    Past Medical History  Past Medical History:   Diagnosis Date    Acquired trigger finger     Back pain     Chest pain     Elevated cholesterol     Hypercholesterolemia     Kidney stones     Type 1 diabetes mellitus        Surgical History  Past Surgical History:   Procedure Laterality Date    COLONOSCOPY      COLONOSCOPY N/A 9/18/2023    Procedure: COLONOSCOPY;  Surgeon: Johnathan Irvin MD;  [facility]: [facility];  Service: Gastroenterology;  Laterality: N/A;    WISDOM TOOTH EXTRACTION         Family History  Family History   Problem Relation Age of Onset    Cancer Mother     Hyperlipidemia Mother     Thyroid cancer Mother     Thyroid disease Mother     Diabetes Father     Hyperlipidemia Father     Heart disease Father         Quad bypass       Social History  Social History     Socioeconomic History    Marital status: Other   Tobacco Use    " "Smoking status: Never    Smokeless tobacco: Never   Vaping Use    Vaping status: Never Used   Substance and Sexual Activity    Alcohol use: Yes     Comment: Not weekly but on occasion    Drug use: Never    Sexual activity: Never       Objective   Vital Signs:   /58 (BP Location: Right arm, Patient Position: Sitting, Cuff Size: Adult)   Pulse 107   Temp 96.9 °F (36.1 °C) (Temporal)   Resp 16   Ht 172.7 cm (68\")   Wt 124 kg (273 lb)   SpO2 96%   BMI 41.51 kg/m²     Physical Exam     Gen: Patient in NAD. Pleasant and answers appropriately. A&Ox3.    Skin: Warm and dry with normal turgor. No purpura, rashes, or unusual pigmentation noted. Hair is normal in appearance and distribution.    HEENT: NC/AT. No lesions noted. Conjunctiva clear, sclera nonicteric. PERRL. EOMI without nystagmus or strabismus. Fundi appear benign. No hemorrhages or exudates of eyes. Auditory canals are patent bilaterally without lesions. TMs intact,  nonerythematous, nonbulging without lesions. Nasal mucosa pink, nonerythematous, and nonedematous. Frontal and maxillary sinuses are nontender. O/P nonerythematous and moist without exudate.    Neck: Supple without lymph nodes palpated.  Decreased ROM.     Lungs: Decreased B/L without rales, rhonchi, crackles, or wheezes.    Heart: RRR. S1 and S2 normal. No S3 or S4. No MRGT.    Abd: Soft, nontender,nondistended. (+)BSx4 quadrants.     Extrem: No CCE. Radial pulses 2+/4 and equal B/L. FROMx4.  Positive joint tenderness noted.  Positive Tinel's and Phalen's.    Back: Decreased range of motion.    Neuro: No focal motor/sensory deficits.    Procedures    Result Review :   The following data was reviewed by: Magalis Malcolm MD on 07/16/2024:                Assessment and Plan    Mendez Deluna is a 53 y.o. here for medical followup.    Diagnoses and all orders for this visit:    1. Chronic bilateral back pain, unspecified back location (Primary)  -     Comprehensive Metabolic Panel; " Future  -     MRI thoracic spine wo contrast; Future  -     MRI Lumbar Spine Without Contrast; Future    2. Hyperlipidemia due to type 1 diabetes mellitus  -     Lipid Panel; Future    3. Uncontrolled type 1 diabetes mellitus with hypoglycemia without coma  -     Comprehensive Metabolic Panel; Future  -     Hemoglobin A1c; Future  -     MicroAlbumin, Urine, Random - Urine, Clean Catch; Future          Problem List Items Addressed This Visit          Cardiac and Vasculature    Hyperlipidemia due to type 1 diabetes mellitus    Relevant Orders    Lipid Panel       Endocrine and Metabolic    Uncontrolled type 1 diabetes mellitus with hypoglycemia without coma    Overview     Since age 11         Relevant Orders    Comprehensive Metabolic Panel    Hemoglobin A1c    MicroAlbumin, Urine, Random - Urine, Clean Catch     Other Visit Diagnoses       Chronic bilateral back pain, unspecified back location    -  Primary    Relevant Orders    Comprehensive Metabolic Panel    MRI thoracic spine wo contrast    MRI Lumbar Spine Without Contrast                   Follow Up   Return in about 3 months (around 10/16/2024), or ([facility] Thomas - [facility] cardiology).  Findings and plans discussed with patient who verbalizes understanding and agreement. Will followup with patient once results are in. Patient was given instructions and counseling regarding his condition or for health maintenance advice. Please see specific information pulled into the AVS if appropriate.       Magalis Malcolm MD

## 2024-08-01 DIAGNOSIS — K21.9 GASTROESOPHAGEAL REFLUX DISEASE WITHOUT ESOPHAGITIS: ICD-10-CM

## 2024-08-01 RX ORDER — PANTOPRAZOLE SODIUM 40 MG/1
40 TABLET, DELAYED RELEASE ORAL DAILY
Qty: 90 TABLET | Refills: 1 | OUTPATIENT
Start: 2024-08-01

## 2024-08-28 ENCOUNTER — LAB (OUTPATIENT)
Dept: FAMILY MEDICINE CLINIC | Facility: CLINIC | Age: 54
End: 2024-08-28
Payer: MEDICAID

## 2024-08-28 DIAGNOSIS — E78.5 HYPERLIPIDEMIA DUE TO TYPE 1 DIABETES MELLITUS: ICD-10-CM

## 2024-08-28 DIAGNOSIS — I15.2 HYPERTENSION ASSOCIATED WITH DIABETES: ICD-10-CM

## 2024-08-28 DIAGNOSIS — E10.69 HYPERLIPIDEMIA DUE TO TYPE 1 DIABETES MELLITUS: ICD-10-CM

## 2024-08-28 DIAGNOSIS — E11.59 HYPERTENSION ASSOCIATED WITH DIABETES: ICD-10-CM

## 2024-08-28 DIAGNOSIS — E10.649 UNCONTROLLED TYPE 1 DIABETES MELLITUS WITH HYPOGLYCEMIA WITHOUT COMA: Primary | ICD-10-CM

## 2024-08-29 LAB
ALBUMIN SERPL-MCNC: 4.1 G/DL (ref 3.5–5.2)
ALBUMIN/GLOB SERPL: 2.1 G/DL
ALP SERPL-CCNC: 49 U/L (ref 39–117)
ALT SERPL-CCNC: 22 U/L (ref 1–41)
AST SERPL-CCNC: 17 U/L (ref 1–40)
BILIRUB SERPL-MCNC: 0.2 MG/DL (ref 0–1.2)
BUN SERPL-MCNC: 21 MG/DL (ref 6–20)
BUN/CREAT SERPL: 31.8 (ref 7–25)
CALCIUM SERPL-MCNC: 8.6 MG/DL (ref 8.6–10.5)
CHLORIDE SERPL-SCNC: 103 MMOL/L (ref 98–107)
CHOLEST SERPL-MCNC: 125 MG/DL (ref 0–200)
CO2 SERPL-SCNC: 27 MMOL/L (ref 22–29)
CREAT SERPL-MCNC: 0.66 MG/DL (ref 0.76–1.27)
EGFRCR SERPLBLD CKD-EPI 2021: 110.8 ML/MIN/1.73
GLOBULIN SER CALC-MCNC: 2 GM/DL
GLUCOSE SERPL-MCNC: 116 MG/DL (ref 65–99)
HDLC SERPL-MCNC: 33 MG/DL (ref 40–60)
IMP & REVIEW OF LAB RESULTS: NORMAL
LDLC SERPL CALC-MCNC: 79 MG/DL (ref 0–100)
POTASSIUM SERPL-SCNC: 4.3 MMOL/L (ref 3.5–5.2)
PROT SERPL-MCNC: 6.1 G/DL (ref 6–8.5)
SODIUM SERPL-SCNC: 138 MMOL/L (ref 136–145)
TRIGL SERPL-MCNC: 62 MG/DL (ref 0–150)
VLDLC SERPL CALC-MCNC: 13 MG/DL (ref 5–40)

## 2024-09-03 ENCOUNTER — PATIENT MESSAGE (OUTPATIENT)
Dept: FAMILY MEDICINE CLINIC | Facility: CLINIC | Age: 54
End: 2024-09-03
Payer: MEDICAID

## 2024-09-05 ENCOUNTER — TELEPHONE (OUTPATIENT)
Dept: FAMILY MEDICINE CLINIC | Facility: CLINIC | Age: 54
End: 2024-09-05
Payer: MEDICAID

## 2024-09-05 NOTE — TELEPHONE ENCOUNTER
Caller: Mendez Deluna    Relationship: Self    Best call back number: [phone]     What orders are you requesting (i.e. lab or imaging): PHYSICAL THERAPY LOW BACK PAIN    In what timeframe would the patient need to come in: ASAP    Where will you receive your lab/imaging services: [facility]    Additional notes:

## 2024-09-09 RX ORDER — INSULIN LISPRO-AABC 200 [IU]/ML
300 INJECTION, SOLUTION SUBCUTANEOUS DAILY
Qty: 270 ML | Refills: 1 | Status: SHIPPED | OUTPATIENT
Start: 2024-09-09

## 2024-09-09 NOTE — TELEPHONE ENCOUNTER
Rx Refill Note  Requested Prescriptions     Pending Prescriptions Disp Refills    Insulin Lispro-aabc (Margaret Cerda) 200 UNIT/ML solution pen-injector 270 mL 1     Sig: Inject 300 Units under the skin into the appropriate area as directed Daily.      Last office visit with prescribing clinician: 4/24/2024   Next office visit with prescribing clinician: 10/25/2024                           Radha Tolbert MA  09/09/24, 16:20 EDT

## 2024-09-10 DIAGNOSIS — M54.9 CHRONIC BILATERAL BACK PAIN, UNSPECIFIED BACK LOCATION: Primary | ICD-10-CM

## 2024-09-10 DIAGNOSIS — G89.29 CHRONIC BILATERAL BACK PAIN, UNSPECIFIED BACK LOCATION: Primary | ICD-10-CM

## 2024-09-18 ENCOUNTER — PRIOR AUTHORIZATION (OUTPATIENT)
Dept: ENDOCRINOLOGY | Facility: CLINIC | Age: 54
End: 2024-09-18
Payer: MEDICAID

## 2024-09-24 ENCOUNTER — HOSPITAL ENCOUNTER (OUTPATIENT)
Dept: MRI IMAGING | Facility: HOSPITAL | Age: 54
Discharge: HOME | End: 2024-09-24
Payer: MEDICAID

## 2024-09-24 DIAGNOSIS — M54.9 CHRONIC BILATERAL BACK PAIN, UNSPECIFIED BACK LOCATION: ICD-10-CM

## 2024-09-24 DIAGNOSIS — G89.29 CHRONIC BILATERAL BACK PAIN, UNSPECIFIED BACK LOCATION: ICD-10-CM

## 2024-09-24 PROCEDURE — 72146 MRI CHEST SPINE W/O DYE: CPT | Performed by: RADIOLOGY

## 2024-09-24 PROCEDURE — 72148 MRI LUMBAR SPINE W/O DYE: CPT | Performed by: RADIOLOGY

## 2024-09-24 PROCEDURE — 72148 MRI LUMBAR SPINE W/O DYE: CPT

## 2024-09-24 PROCEDURE — 72146 MRI CHEST SPINE W/O DYE: CPT

## 2024-10-17 ENCOUNTER — OFFICE VISIT (OUTPATIENT)
Dept: FAMILY MEDICINE CLINIC | Facility: CLINIC | Age: 54
End: 2024-10-17
Payer: MEDICAID

## 2024-10-17 VITALS
OXYGEN SATURATION: 96 % | RESPIRATION RATE: 16 BRPM | SYSTOLIC BLOOD PRESSURE: 136 MMHG | DIASTOLIC BLOOD PRESSURE: 64 MMHG | WEIGHT: 272.4 LBS | BODY MASS INDEX: 41.28 KG/M2 | TEMPERATURE: 97.5 F | HEART RATE: 89 BPM | HEIGHT: 68 IN

## 2024-10-17 DIAGNOSIS — G89.29 CHRONIC BILATERAL BACK PAIN, UNSPECIFIED BACK LOCATION: Primary | ICD-10-CM

## 2024-10-17 DIAGNOSIS — K21.9 GASTROESOPHAGEAL REFLUX DISEASE WITHOUT ESOPHAGITIS: ICD-10-CM

## 2024-10-17 DIAGNOSIS — I15.2 HYPERTENSION ASSOCIATED WITH DIABETES: ICD-10-CM

## 2024-10-17 DIAGNOSIS — E11.69 HYPERLIPIDEMIA ASSOCIATED WITH TYPE 2 DIABETES MELLITUS: ICD-10-CM

## 2024-10-17 DIAGNOSIS — R60.0 LOCALIZED EDEMA: ICD-10-CM

## 2024-10-17 DIAGNOSIS — E11.59 HYPERTENSION ASSOCIATED WITH DIABETES: ICD-10-CM

## 2024-10-17 DIAGNOSIS — E78.5 HYPERLIPIDEMIA ASSOCIATED WITH TYPE 2 DIABETES MELLITUS: ICD-10-CM

## 2024-10-17 DIAGNOSIS — M54.9 CHRONIC BILATERAL BACK PAIN, UNSPECIFIED BACK LOCATION: Primary | ICD-10-CM

## 2024-10-17 PROCEDURE — 3044F HG A1C LEVEL LT 7.0%: CPT | Performed by: FAMILY MEDICINE

## 2024-10-17 PROCEDURE — 1125F AMNT PAIN NOTED PAIN PRSNT: CPT | Performed by: FAMILY MEDICINE

## 2024-10-17 PROCEDURE — 1159F MED LIST DOCD IN RCRD: CPT | Performed by: FAMILY MEDICINE

## 2024-10-17 PROCEDURE — 99214 OFFICE O/P EST MOD 30 MIN: CPT | Performed by: FAMILY MEDICINE

## 2024-10-17 PROCEDURE — 1160F RVW MEDS BY RX/DR IN RCRD: CPT | Performed by: FAMILY MEDICINE

## 2024-10-17 RX ORDER — FUROSEMIDE 40 MG/1
40 TABLET ORAL DAILY
Qty: 90 TABLET | Refills: 1 | Status: SHIPPED | OUTPATIENT
Start: 2024-10-17

## 2024-10-17 RX ORDER — LISINOPRIL 10 MG/1
10 TABLET ORAL DAILY
Qty: 90 TABLET | Refills: 1 | Status: SHIPPED | OUTPATIENT
Start: 2024-10-17

## 2024-10-17 RX ORDER — PANTOPRAZOLE SODIUM 40 MG/1
40 TABLET, DELAYED RELEASE ORAL DAILY
Qty: 90 TABLET | Refills: 1 | Status: SHIPPED | OUTPATIENT
Start: 2024-10-17

## 2024-10-17 RX ORDER — ROSUVASTATIN CALCIUM 40 MG/1
40 TABLET, COATED ORAL DAILY
Qty: 90 TABLET | Refills: 1 | Status: SHIPPED | OUTPATIENT
Start: 2024-10-17

## 2024-10-25 ENCOUNTER — OFFICE VISIT (OUTPATIENT)
Age: 54
End: 2024-10-25
Payer: MEDICAID

## 2024-10-25 VITALS
OXYGEN SATURATION: 98 % | HEART RATE: 94 BPM | DIASTOLIC BLOOD PRESSURE: 68 MMHG | BODY MASS INDEX: 41.68 KG/M2 | WEIGHT: 275 LBS | SYSTOLIC BLOOD PRESSURE: 136 MMHG | HEIGHT: 68 IN

## 2024-10-25 DIAGNOSIS — E10.649 UNCONTROLLED TYPE 1 DIABETES MELLITUS WITH HYPOGLYCEMIA WITHOUT COMA: Primary | ICD-10-CM

## 2024-10-25 DIAGNOSIS — E66.01 CLASS 3 SEVERE OBESITY DUE TO EXCESS CALORIES WITH SERIOUS COMORBIDITY AND BODY MASS INDEX (BMI) OF 40.0 TO 44.9 IN ADULT: ICD-10-CM

## 2024-10-25 DIAGNOSIS — E66.813 CLASS 3 SEVERE OBESITY DUE TO EXCESS CALORIES WITH SERIOUS COMORBIDITY AND BODY MASS INDEX (BMI) OF 40.0 TO 44.9 IN ADULT: ICD-10-CM

## 2024-10-25 LAB
EXPIRATION DATE: ABNORMAL
EXPIRATION DATE: NORMAL
GLUCOSE BLDC GLUCOMTR-MCNC: 165 MG/DL (ref 70–130)
HBA1C MFR BLD: 5.7 % (ref 4.5–5.7)
Lab: ABNORMAL
Lab: NORMAL

## 2024-10-25 RX ORDER — SEMAGLUTIDE 0.25 MG/.5ML
0.25 INJECTION, SOLUTION SUBCUTANEOUS WEEKLY
Qty: 2 ML | Refills: 0 | Status: SHIPPED | OUTPATIENT
Start: 2024-10-25

## 2024-10-25 RX ORDER — ACYCLOVIR 400 MG/1
1 TABLET ORAL
Qty: 9 EACH | Refills: 3 | Status: SHIPPED | OUTPATIENT
Start: 2024-10-25

## 2024-10-25 NOTE — PROGRESS NOTES
"     Office Note      Date: 10/25/2024  Patient Name: Mendez Deluna  MRN: [id number]  : 1970    Chief Complaint   Patient presents with    Diabetes       History of Present Illness:   Mendez Deluna is a 53 y.o. male who presents for Diabetes type 1.   Current Rxinsulin in op5 with g6 will switch to g7          Last A1c:  Hemoglobin A1C   Date Value Ref Range Status   10/25/2024 5.7 4.5 - 5.7 % Final   2022 6.60 (H) 4.80 - 5.60 % Final       Changes in health since last visit: back pain worsens . Last eye exam up to date.    Subjective              Review of Systems:   Review of Systems    The following portions of the patient's history were reviewed and updated as appropriate: allergies, current medications, past family history, past medical history, past social history, past surgical history, and problem list.    Objective     Visit Vitals  /68 (BP Location: Left arm, Patient Position: Sitting, Cuff Size: Adult)   Pulse 94   Ht 172.7 cm (68\")   Wt 125 kg (275 lb)   SpO2 98%   BMI 41.81 kg/m²           Physical Exam:  Physical Exam  Vitals reviewed.   Constitutional:       Appearance: Normal appearance.   Neurological:      Mental Status: He is alert.   Psychiatric:         Mood and Affect: Mood normal.         Thought Content: Thought content normal.         Judgment: Judgment normal.          Assessment / Plan      Assessment & Plan:  Problem List Items Addressed This Visit       Uncontrolled type 1 diabetes mellitus with hypoglycemia without coma - Primary    Overview     Since age 11         Current Assessment & Plan      Stable  No adjustments needed          Relevant Medications    Toujeo Max SoloStar 300 UNIT/ML solution pen-injector injection    Insulin Lispro-aabc (Margaret Cerda) 200 UNIT/ML solution pen-injector    Other Relevant Orders    POC Glucose, Blood (Completed)    POC Glycosylated Hemoglobin (Hb A1C) (Completed)    Class 3 severe obesity due to excess calories with serious " comorbidity and body mass index (BMI) of 40.0 to 44.9 in adult    Current Assessment & Plan     Worse. The plan is wegovy          Relevant Medications    Semaglutide-Weight Management (Wegovy) 0.25 MG/0.5ML solution auto-injector         Electronically signed by : Everardo Troncoso MD  10/25/2024

## 2024-10-28 ENCOUNTER — PRIOR AUTHORIZATION (OUTPATIENT)
Dept: ENDOCRINOLOGY | Facility: CLINIC | Age: 54
End: 2024-10-28
Payer: MEDICAID

## 2024-10-30 ENCOUNTER — TELEPHONE (OUTPATIENT)
Dept: ENDOCRINOLOGY | Facility: CLINIC | Age: 54
End: 2024-10-30

## 2024-10-30 NOTE — TELEPHONE ENCOUNTER
Left message to return call.  
Left message to return call.  Completing message pending return call from patient.  
PA for Wegovy submitted via [facility].  
We could only justify the cardiovascular use in a patient with a prior history of cardiovascular disease. It is not used for primary prevention, just secondary prevention  
Wegovy denied by insurance.  The medication is being prescribed to lower the risk of death from cardiovascular [heart]   causes, myocardial infarction [heart attack], or stroke. Please note: Wegovy used for weight   loss is considered a benefit exclusion pursuant to the Social Security Act 1927(D)(2).  
[Negative] : Heme/Lymph

## 2024-11-04 NOTE — PROGRESS NOTES
"Mendez Deluna     VITALS: Blood pressure 136/64, pulse 89, temperature 97.5 °F (36.4 °C), temperature source Temporal, resp. rate 16, height 172.7 cm (68\"), weight 124 kg (272 lb 6.4 oz), SpO2 96%.    Subjective  Chief Complaint  Back Pain, Carpal Tunnel, and Range of Motion Difficulties    Subjective          History of Present Illness:  Patient is a 53 y.o.  male with medical conditions significant for hyperlipidemia and type 1 diabetes who presents to clinic secondary to medical followup.     Patient states that his arthritis has been bothering him lately.  He has been having increased back pain.  He denies any new trauma or injury to the areas.    No complaints about any of the medications.    The following portions of the patient's history were reviewed and updated as appropriate: allergies, current medications, past family history, past medical history, past social history, past surgical history and problem list.    Past Medical History  Past Medical History:   Diagnosis Date    Acquired trigger finger     Back pain     Chest pain     Elevated cholesterol     Hypercholesterolemia     Kidney stones     Type 1 diabetes mellitus        Surgical History  Past Surgical History:   Procedure Laterality Date    COLONOSCOPY      COLONOSCOPY N/A 9/18/2023    Procedure: COLONOSCOPY;  Surgeon: Johnathan Irvin MD;  Location: [facility];  Service: Gastroenterology;  Laterality: N/A;    WISDOM TOOTH EXTRACTION         Family History  Family History   Problem Relation Age of Onset    Cancer Mother     Hyperlipidemia Mother     Thyroid cancer Mother     Thyroid disease Mother     Diabetes Father     Hyperlipidemia Father     Heart disease Father         Quad bypass       Social History  Social History     Socioeconomic History    Marital status: Other   Tobacco Use    Smoking status: Never    Smokeless tobacco: Never   Vaping Use    Vaping status: Never Used   Substance and Sexual Activity    Alcohol use: Yes     " "Comment: Not weekly but on occasion    Drug use: Never    Sexual activity: Never       Objective   Vital Signs:   /64 (BP Location: Right arm, Patient Position: Sitting, Cuff Size: Adult)   Pulse 89   Temp 97.5 °F (36.4 °C) (Temporal)   Resp 16   Ht 172.7 cm (68\")   Wt 124 kg (272 lb 6.4 oz)   SpO2 96%   BMI 41.42 kg/m²     Physical Exam     Gen: Patient in NAD. Pleasant and answers appropriately. A&Ox3.    Skin: Warm and dry with normal turgor. No purpura, rashes, or unusual pigmentation noted. Hair is normal in appearance and distribution.    HEENT: NC/AT. No lesions noted. Conjunctiva clear, sclera nonicteric. PERRL. EOMI without nystagmus or strabismus. Fundi appear benign. No hemorrhages or exudates of eyes. Auditory canals are patent bilaterally without lesions. TMs intact,  nonerythematous, nonbulging without lesions. Nasal mucosa pink, nonerythematous, and nonedematous. Frontal and maxillary sinuses are nontender. O/P nonerythematous and moist without exudate.    Neck: Supple without lymph nodes palpated.  Decreased ROM.     Lungs: CTA B/L without rales, rhonchi, crackles, or wheezes.    Heart: RRR. S1 and S2 normal. No S3 or S4. No MRGT.    Abd: Soft, nontender,nondistended. (+)BSx4 quadrants.     Extrem: No CCE. Radial pulses 2+/4 and equal B/L. FROMx4.  Positive joint tenderness noted.    Neuro: No focal motor/sensory deficits.    Procedures    Result Review :   The following data was reviewed by: Magalis Malcolm MD on 10/17/2024:                Assessment and Plan    Mendez Deluna is a 53 y.o. here for medical followup.    Diagnoses and all orders for this visit:    1. Chronic bilateral back pain, unspecified back location (Primary)  -     Ambulatory Referral to Physical Therapy for Evaluation & Treatment  -     Ambulatory Referral to Pain Management    2. Localized edema  -     furosemide (LASIX) 40 MG tablet; Take 1 tablet by mouth Daily.  Dispense: 90 tablet; Refill: 1    3. " Hypertension associated with diabetes  -     lisinopril (PRINIVIL,ZESTRIL) 10 MG tablet; Take 1 tablet by mouth Daily.  Dispense: 90 tablet; Refill: 1    4. Gastroesophageal reflux disease without esophagitis  -     pantoprazole (PROTONIX) 40 MG EC tablet; Take 1 tablet by mouth Daily.  Dispense: 90 tablet; Refill: 1    5. Hyperlipidemia associated with type 2 diabetes mellitus  -     rosuvastatin (CRESTOR) 40 MG tablet; Take 1 tablet by mouth Daily.  Dispense: 90 tablet; Refill: 1          Problem List Items Addressed This Visit    None  Visit Diagnoses       Chronic bilateral back pain, unspecified back location    -  Primary    Relevant Orders    Ambulatory Referral to Physical Therapy for Evaluation & Treatment (Completed)    Ambulatory Referral to Pain Management (Completed)    Localized edema        Relevant Medications    furosemide (LASIX) 40 MG tablet    Hypertension associated with diabetes        Relevant Medications    furosemide (LASIX) 40 MG tablet    lisinopril (PRINIVIL,ZESTRIL) 10 MG tablet    Gastroesophageal reflux disease without esophagitis        Relevant Medications    pantoprazole (PROTONIX) 40 MG EC tablet    Hyperlipidemia associated with type 2 diabetes mellitus        Relevant Medications    rosuvastatin (CRESTOR) 40 MG tablet                  Follow Up   Return in about 3 months (around 1/17/2025).  Findings and plans discussed with patient who verbalizes understanding and agreement. Will followup with patient once results are in. Patient was given instructions and counseling regarding his condition or for health maintenance advice. Please see specific information pulled into the AVS if appropriate.       Magalis Malcolm MD

## 2025-01-16 DIAGNOSIS — K21.9 GASTROESOPHAGEAL REFLUX DISEASE WITHOUT ESOPHAGITIS: ICD-10-CM

## 2025-01-16 RX ORDER — PANTOPRAZOLE SODIUM 40 MG/1
40 TABLET, DELAYED RELEASE ORAL DAILY
Qty: 90 TABLET | Refills: 1 | OUTPATIENT
Start: 2025-01-16

## 2025-01-23 ENCOUNTER — OFFICE VISIT (OUTPATIENT)
Dept: FAMILY MEDICINE CLINIC | Facility: CLINIC | Age: 55
End: 2025-01-23
Payer: MEDICAID

## 2025-01-23 VITALS
SYSTOLIC BLOOD PRESSURE: 138 MMHG | TEMPERATURE: 97.9 F | OXYGEN SATURATION: 95 % | DIASTOLIC BLOOD PRESSURE: 78 MMHG | WEIGHT: 279.6 LBS | HEIGHT: 68 IN | BODY MASS INDEX: 42.37 KG/M2 | HEART RATE: 100 BPM

## 2025-01-23 DIAGNOSIS — R60.0 LOCALIZED EDEMA: ICD-10-CM

## 2025-01-23 DIAGNOSIS — Z12.5 ENCOUNTER FOR SCREENING FOR MALIGNANT NEOPLASM OF PROSTATE: ICD-10-CM

## 2025-01-23 DIAGNOSIS — E11.59 HYPERTENSION ASSOCIATED WITH DIABETES: ICD-10-CM

## 2025-01-23 DIAGNOSIS — K21.9 GASTROESOPHAGEAL REFLUX DISEASE WITHOUT ESOPHAGITIS: ICD-10-CM

## 2025-01-23 DIAGNOSIS — E11.69 HYPERLIPIDEMIA ASSOCIATED WITH TYPE 2 DIABETES MELLITUS: ICD-10-CM

## 2025-01-23 DIAGNOSIS — E78.5 HYPERLIPIDEMIA ASSOCIATED WITH TYPE 2 DIABETES MELLITUS: ICD-10-CM

## 2025-01-23 DIAGNOSIS — E10.649 UNCONTROLLED TYPE 1 DIABETES MELLITUS WITH HYPOGLYCEMIA WITHOUT COMA: Primary | ICD-10-CM

## 2025-01-23 DIAGNOSIS — I15.2 HYPERTENSION ASSOCIATED WITH DIABETES: ICD-10-CM

## 2025-01-23 PROCEDURE — 1159F MED LIST DOCD IN RCRD: CPT | Performed by: FAMILY MEDICINE

## 2025-01-23 PROCEDURE — 99214 OFFICE O/P EST MOD 30 MIN: CPT | Performed by: FAMILY MEDICINE

## 2025-01-23 PROCEDURE — 1160F RVW MEDS BY RX/DR IN RCRD: CPT | Performed by: FAMILY MEDICINE

## 2025-01-23 PROCEDURE — 1125F AMNT PAIN NOTED PAIN PRSNT: CPT | Performed by: FAMILY MEDICINE

## 2025-01-23 RX ORDER — LISINOPRIL 10 MG/1
10 TABLET ORAL DAILY
Qty: 90 TABLET | Refills: 1 | Status: SHIPPED | OUTPATIENT
Start: 2025-01-23

## 2025-01-23 RX ORDER — ROSUVASTATIN CALCIUM 40 MG/1
40 TABLET, COATED ORAL DAILY
Qty: 90 TABLET | Refills: 1 | Status: SHIPPED | OUTPATIENT
Start: 2025-01-23

## 2025-01-23 RX ORDER — FUROSEMIDE 40 MG/1
40 TABLET ORAL DAILY
Qty: 90 TABLET | Refills: 1 | Status: SHIPPED | OUTPATIENT
Start: 2025-01-23

## 2025-01-23 RX ORDER — PANTOPRAZOLE SODIUM 40 MG/1
40 TABLET, DELAYED RELEASE ORAL DAILY
Qty: 90 TABLET | Refills: 1 | Status: SHIPPED | OUTPATIENT
Start: 2025-01-23

## 2025-01-24 LAB
ALBUMIN SERPL-MCNC: 3.9 G/DL (ref 3.5–5.2)
ALBUMIN/GLOB SERPL: 1.4 G/DL
ALP SERPL-CCNC: 50 U/L (ref 39–117)
ALT SERPL-CCNC: 27 U/L (ref 1–41)
AST SERPL-CCNC: 20 U/L (ref 1–40)
BILIRUB SERPL-MCNC: 0.3 MG/DL (ref 0–1.2)
BUN SERPL-MCNC: 19 MG/DL (ref 6–20)
BUN/CREAT SERPL: 19.2 (ref 7–25)
CALCIUM SERPL-MCNC: 8.7 MG/DL (ref 8.6–10.5)
CHLORIDE SERPL-SCNC: 102 MMOL/L (ref 98–107)
CO2 SERPL-SCNC: 25.4 MMOL/L (ref 22–29)
CREAT SERPL-MCNC: 0.99 MG/DL (ref 0.76–1.27)
EGFRCR SERPLBLD CKD-EPI 2021: 89.4 ML/MIN/1.73
GLOBULIN SER CALC-MCNC: 2.7 GM/DL
GLUCOSE SERPL-MCNC: 138 MG/DL (ref 65–99)
POTASSIUM SERPL-SCNC: 4.3 MMOL/L (ref 3.5–5.2)
PROT SERPL-MCNC: 6.6 G/DL (ref 6–8.5)
PSA SERPL-MCNC: 0.54 NG/ML (ref 0–4)
SODIUM SERPL-SCNC: 136 MMOL/L (ref 136–145)

## 2025-02-10 NOTE — PROGRESS NOTES
"Mendez Deluna     VITALS: Blood pressure 138/78, pulse 100, temperature 97.9 °F (36.6 °C), temperature source Temporal, height 172.7 cm (68\"), weight 127 kg (279 lb 9.6 oz), SpO2 95%.    Subjective  Chief Complaint  Chronic bilateral back pain, unspecified back location    Subjective          History of Present Illness:    History of Present Illness  The patient is a 54-year-old male with medical conditions significant for hyperlipidemia and type 1 diabetes who presents to the clinic for a medical follow-up.    He has been managing his type 1 diabetes effectively, as evidenced by his recent A1c level of 5.7. He is not currently on Wegovy, despite Dr. Troncoso' recommendation, due to insurance coverage issues. He has expressed interest in exploring the possibility of being reclassified as a type 2 diabetic, which he believes may facilitate insurance coverage for Wegovy. He has an upcoming appointment with Dr. Troncoso in a few months to discuss this further. He acknowledges that his current insulin dosage is high and is hopeful that Wegovy could enhance his insulin efficiency.    He has been experiencing back pain, which he attributes to muscle strain. He was previously referred to a physical therapist and a pain clinic but has not received any communication from them. He is considering the use of a back brace, as recommended by his girlfriend, who has found it beneficial for her own back issues. He has observed that his back pain improves when he is more active, such as during travel, but worsens when he is sedentary. He also reports a recent incident where he may have strained his ribs while assembling furniture, resulting in persistent pain.    He reports experiencing a sensation of bubbles in his left eye, which he can sometimes hear. He suspects this may be sinus-related, as he has found relief by applying pressure to the area. He has not noticed any significant changes in his eye appearance. He has not yet tried warm " deana for this issue. He is under the care of Dr. Newell for his eye health and is considering seeking a second opinion from another ophthalmologist.    He has been diagnosed with carpal tunnel syndrome, which has been causing significant discomfort in one of his fingers. He has previously sought treatment at a hand surgery clinic in [place].    He has been on Lasix but has reduced his intake due to inconvenience during travel. He has also discontinued his cholesterol medication for the past 1.5 to 2 months to assess his condition without it. He maintains a healthy diet, avoiding beef and incorporating tofu and textured vegetable protein.    FAMILY HISTORY  His father has macular degeneration.      No complaints regarding medications.     The following portions of the patient's history were reviewed and updated as appropriate: allergies, current medications, past family history, past medical history, past social history, past surgical history and problem list.    Past Medical History  Past Medical History:   Diagnosis Date    Acquired trigger finger     Back pain     Chest pain     Elevated cholesterol     Hypercholesterolemia     Kidney stones     Type 1 diabetes mellitus        Surgical History  Past Surgical History:   Procedure Laterality Date    COLONOSCOPY      COLONOSCOPY N/A 9/18/2023    Procedure: COLONOSCOPY;  Surgeon: Johnathan Irvin MD;  Location: [facility];  Service: Gastroenterology;  Laterality: N/A;    WISDOM TOOTH EXTRACTION         Family History  Family History   Problem Relation Age of Onset    Cancer Mother     Hyperlipidemia Mother     Thyroid cancer Mother     Thyroid disease Mother     Diabetes Father     Hyperlipidemia Father     Heart disease Father         Quad bypass       Social History  Social History     Socioeconomic History    Marital status: Other   Tobacco Use    Smoking status: Never    Smokeless tobacco: Never   Vaping Use    Vaping status: Never Used  "  Substance and Sexual Activity    Alcohol use: Yes     Comment: Not weekly but on occasion    Drug use: Never    Sexual activity: Never       Objective   Vital Signs:   /78 (BP Location: Right arm, Patient Position: Sitting, Cuff Size: Adult)   Pulse 100   Temp 97.9 °F (36.6 °C) (Temporal)   Ht 172.7 cm (68\")   Wt 127 kg (279 lb 9.6 oz)   SpO2 95%   BMI 42.51 kg/m²       Physical Exam     Physical Exam      Gen: Patient in NAD. Pleasant and answers appropriately. A&Ox3.    Skin: Warm and dry with normal turgor. No purpura, rashes, or unusual pigmentation noted. Hair is normal in appearance and distribution.    HEENT: NC/AT. No lesions noted. Conjunctiva clear, sclera nonicteric. PERRL. EOMI without nystagmus or strabismus. Fundi appear benign. No hemorrhages or exudates of eyes. Auditory canals are patent bilaterally without lesions. TMs intact,  nonerythematous, bulging without lesions. Nasal mucosa erythematous, and nonedematous. Frontal and maxillary sinuses are nontender. O/P erythematous and moist without exudate.    Neck: Supple without lymph nodes palpated.  Decreased ROM. No carotid bruits appreciated bilaterally.    Lungs: Decreased B/L without rales, rhonchi, crackles, or wheezes.    Heart: RRR. S1 and S2 normal. No S3 or S4. No MRGT.    Abd: Soft, nontender,nondistended. (+)BSx4 quadrants.     Extrem: No CCE. Radial pulses 2+/4 and equal B/L. FROMx4.  Positive joint tenderness noted.    Back: Decreased range of motion.    Neuro: No focal motor/sensory deficits.    Procedures    Result Review :   The following data was reviewed by: Magalis Malcolm MD on 01/23/2025:       Results  Laboratory Studies  October 2024 A1c was 5.7.           Assessment and Plan      Mendez Deluna is a 54 y.o. here for medical followup.    Diagnoses and all orders for this visit:    1. Uncontrolled type 1 diabetes mellitus with hypoglycemia without coma (Primary)  -     Cancel: Hemoglobin A1c; Future  -     " Hemoglobin A1c  -     Comprehensive Metabolic Panel    2. Hyperlipidemia associated with type 2 diabetes mellitus  -     rosuvastatin (CRESTOR) 40 MG tablet; Take 1 tablet by mouth Daily.  Dispense: 90 tablet; Refill: 1  -     Cancel: Comprehensive Metabolic Panel; Future  -     Hemoglobin A1c  -     Comprehensive Metabolic Panel    3. Gastroesophageal reflux disease without esophagitis  -     pantoprazole (PROTONIX) 40 MG EC tablet; Take 1 tablet by mouth Daily.  Dispense: 90 tablet; Refill: 1  -     Cancel: Comprehensive Metabolic Panel; Future  -     Hemoglobin A1c  -     Comprehensive Metabolic Panel    4. Hypertension associated with diabetes  -     lisinopril (PRINIVIL,ZESTRIL) 10 MG tablet; Take 1 tablet by mouth Daily.  Dispense: 90 tablet; Refill: 1  -     Cancel: Comprehensive Metabolic Panel; Future  -     Hemoglobin A1c  -     Comprehensive Metabolic Panel    5. Localized edema  -     furosemide (LASIX) 40 MG tablet; Take 1 tablet by mouth Daily.  Dispense: 90 tablet; Refill: 1  -     Cancel: Comprehensive Metabolic Panel; Future  -     Hemoglobin A1c  -     Comprehensive Metabolic Panel    6. Encounter for screening for malignant neoplasm of prostate  -     Cancel: PSA Screen; Future  -     PSA Screen        Assessment & Plan  1. Type 1 Diabetes Mellitus.  Last A1c is at 5.7. He is not currently on Wegovy due to insurance issues. He is advised to discuss the possibility of being labeled as type 2 diabetic with Dr. Troncoso to potentially get insurance coverage for Wegovy.    2. Back Pain.  He has not received a call from the physical therapy or pain clinic. He is advised to consider using a back brace, which can be purchased online or from a medical supply store. If insurance covers it, a prescription will be provided. He is also encouraged to continue regular exercise and physical therapy to strengthen his back. Ibuprofen 800 mg, to be taken three times daily for a week, is recommended for pain  management. He should also increase his water intake to prevent potential kidney damage from the medication.    3. Left Eye Bubbles.  He reports feeling and sometimes hearing bubbles behind his left eye, which may be related to a cyst in the subcutaneous tissue. Warm compresses are recommended to see if they help prevent the bubbles from forming. He is also advised to consult with an ophthalmologist for further evaluation and management.    4. Carpal Tunnel Syndrome.  He reports significant impact on his finger due to carpal tunnel syndrome. He is advised to follow up with a hand surgeon for further evaluation and management.    5. Hyperlipidemia.  He has stopped taking his cholesterol medication for the past 1.5 to 2 months. His cholesterol levels were within normal limits as of August 2024. He is advised to continue his current diet and lifestyle modifications. A re-evaluation of his cholesterol levels will be conducted in 3 to 6 months.                 Patient or patient representative verbalized consent for the use of Ambient Listening during the visit with  Magalis Malcolm MD for chart documentation. 2/10/2025  06:26 EST        Follow Up   Return in about 3 months (around 4/23/2025).  Findings and plans discussed with patient who verbalizes understanding and agreement. Will followup with patient once results are in. Patient was given instructions and counseling regarding his condition or for health maintenance advice. Please see specific information pulled into the AVS if appropriate.       Magalis Malcolm MD

## 2025-04-10 DIAGNOSIS — R60.0 LOCALIZED EDEMA: ICD-10-CM

## 2025-04-10 DIAGNOSIS — E11.69 HYPERLIPIDEMIA ASSOCIATED WITH TYPE 2 DIABETES MELLITUS: ICD-10-CM

## 2025-04-10 DIAGNOSIS — E11.59 HYPERTENSION ASSOCIATED WITH DIABETES: ICD-10-CM

## 2025-04-10 DIAGNOSIS — I15.2 HYPERTENSION ASSOCIATED WITH DIABETES: ICD-10-CM

## 2025-04-10 DIAGNOSIS — E78.5 HYPERLIPIDEMIA ASSOCIATED WITH TYPE 2 DIABETES MELLITUS: ICD-10-CM

## 2025-04-10 RX ORDER — LISINOPRIL 10 MG/1
10 TABLET ORAL DAILY
Qty: 90 TABLET | Refills: 1 | OUTPATIENT
Start: 2025-04-10

## 2025-04-10 RX ORDER — ROSUVASTATIN CALCIUM 40 MG/1
40 TABLET, COATED ORAL DAILY
Qty: 90 TABLET | Refills: 1 | OUTPATIENT
Start: 2025-04-10

## 2025-04-10 RX ORDER — INSULIN LISPRO-AABC 200 [IU]/ML
INJECTION, SOLUTION SUBCUTANEOUS
Qty: 270 ML | Refills: 1 | Status: SHIPPED | OUTPATIENT
Start: 2025-04-10

## 2025-04-10 RX ORDER — FUROSEMIDE 40 MG/1
40 TABLET ORAL DAILY
Qty: 90 TABLET | Refills: 1 | OUTPATIENT
Start: 2025-04-10

## 2025-04-10 NOTE — TELEPHONE ENCOUNTER
Rx Refill Note  Requested Prescriptions     Pending Prescriptions Disp Refills    Lyumjev KwikPen 200 UNIT/ML solution pen-injector [Pharmacy Med Name: LYUMJEV KWIKPEN 200U/ML L-AABC 3ML] 270 mL 1     Sig: INJECT 300 UNITS UNDER THE SKIN ONCE DAILY      Last office visit with prescribing clinician: 10/25/2024      Next office visit with prescribing clinician: 4/17/2025       Danica Mcintyre MA  04/10/25, 15:19 EDT

## 2025-04-25 ENCOUNTER — LAB (OUTPATIENT)
Dept: FAMILY MEDICINE CLINIC | Facility: CLINIC | Age: 55
End: 2025-04-25
Payer: MEDICAID

## 2025-04-25 ENCOUNTER — OFFICE VISIT (OUTPATIENT)
Dept: FAMILY MEDICINE CLINIC | Facility: CLINIC | Age: 55
End: 2025-04-25
Payer: MEDICAID

## 2025-04-25 VITALS
HEIGHT: 68 IN | WEIGHT: 276 LBS | TEMPERATURE: 97.3 F | SYSTOLIC BLOOD PRESSURE: 160 MMHG | DIASTOLIC BLOOD PRESSURE: 68 MMHG | HEART RATE: 95 BPM | BODY MASS INDEX: 41.83 KG/M2 | OXYGEN SATURATION: 95 %

## 2025-04-25 DIAGNOSIS — E11.59 HYPERTENSION ASSOCIATED WITH DIABETES: ICD-10-CM

## 2025-04-25 DIAGNOSIS — I15.2 HYPERTENSION ASSOCIATED WITH DIABETES: ICD-10-CM

## 2025-04-25 DIAGNOSIS — E10.649 UNCONTROLLED TYPE 1 DIABETES MELLITUS WITH HYPOGLYCEMIA WITHOUT COMA: Primary | ICD-10-CM
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
## 2025-04-25 DIAGNOSIS — E11.69 HYPERLIPIDEMIA ASSOCIATED WITH TYPE 2 DIABETES MELLITUS: ICD-10-CM

## 2025-04-25 DIAGNOSIS — E78.5 HYPERLIPIDEMIA ASSOCIATED WITH TYPE 2 DIABETES MELLITUS: ICD-10-CM

## 2025-04-26 LAB
ALBUMIN SERPL-MCNC: 4.1 G/DL (ref 3.5–5.2)
ALBUMIN/GLOB SERPL: 1.5 G/DL
ALP SERPL-CCNC: 50 U/L (ref 39–117)
ALT SERPL-CCNC: 18 U/L (ref 1–41)
AST SERPL-CCNC: 20 U/L (ref 1–40)
BILIRUB SERPL-MCNC: 0.2 MG/DL (ref 0–1.2)
BUN SERPL-MCNC: 16 MG/DL (ref 6–20)
BUN/CREAT SERPL: 22.9 (ref 7–25)
CALCIUM SERPL-MCNC: 8.7 MG/DL (ref 8.6–10.5)
CHLORIDE SERPL-SCNC: 102 MMOL/L (ref 98–107)
CO2 SERPL-SCNC: 24.2 MMOL/L (ref 22–29)
CREAT SERPL-MCNC: 0.7 MG/DL (ref 0.76–1.27)
EGFRCR SERPLBLD CKD-EPI 2021: 108.1 ML/MIN/1.73
GLOBULIN SER CALC-MCNC: 2.8 GM/DL
GLUCOSE SERPL-MCNC: 75 MG/DL (ref 65–99)
POTASSIUM SERPL-SCNC: 4 MMOL/L (ref 3.5–5.2)
PROT SERPL-MCNC: 6.9 G/DL (ref 6–8.5)
SODIUM SERPL-SCNC: 137 MMOL/L (ref 136–145)

## 2025-04-26 RX ORDER — DOXYCYCLINE 100 MG/1
200 CAPSULE ORAL ONCE
Qty: 2 CAPSULE | Refills: 0 | Status: SHIPPED | OUTPATIENT
Start: 2025-04-26 | End: 2025-04-26

## 2025-05-02 ENCOUNTER — TELEPHONE (OUTPATIENT)
Dept: FAMILY MEDICINE CLINIC | Facility: CLINIC | Age: 55
End: 2025-05-02
Payer: MEDICAID

## 2025-05-02 NOTE — TELEPHONE ENCOUNTER
Spoke with Mendez in regards to labs.  He plans to get A1C drawn thru Endo at a later time and will come in for Urine Microalbumin later as well.  Per Dr Malcolm, lipids are not due at this time.  Patient verbalized understanding.

## 2025-05-12 NOTE — PROGRESS NOTES
"Mendez Deluna     VITALS: Blood pressure 160/68, pulse 95, temperature 97.3 °F (36.3 °C), temperature source Temporal, height 172.7 cm (68\"), weight 125 kg (276 lb), SpO2 95%.    Subjective  Chief Complaint  Diabetes (Glucose 90 reported by pt. )    Subjective          History of Present Illness:    History of Present Illness  The patient is a 54-year-old male with medical conditions significant for hyperlipidemia and type 1 diabetes who presents to the clinic for follow-up.    Persistent eye bubbles have been experienced for several years, which have neither improved nor worsened. The bubbles are occasionally accompanied by a loud noise during blinking but do not interfere with vision. Intermittent bilateral watery eyes are reported, which are not associated with the eye bubbles. No other medical opinions have been sought regarding this issue.    Interest in receiving a measles vaccine booster is expressed due to international travel habits. Consideration of the pneumonia vaccine is primarily to protect his father.    Cholesterol medication was discontinued in 11/2024, and the results of the cholesterol test are awaited. Plans to resume the medication if the results are abnormal are indicated.    Three days ago, fatigue was experienced, and Lasix was taken twice daily. Uncertainty exists if this was due to water retention or two recent tick bites. Previous testing for Lyme disease was negative. Difficulty in removing ticks due to lack of flexibility is noted, and reliance on others for this task is mentioned. No white patches on the skin have been observed.    A 1-kilometer trail has been walked daily since 02/2025, sometimes more than once, but no weight loss has occurred. Heart rate occasionally reaches 130 during these walks. A balanced diet is maintained, avoiding most carbohydrates and red meat, and a bike is owned. A slight reduction in facial puffiness is noticed.    Itching behind the ears has been " experienced for several months, which becomes red upon scratching. The condition is exacerbated by wearing glasses. Treatment with Head and Shoulders shampoo has been attempted.    Blood pressure medication has not been taken for the past three days, and questioning if this could be the cause of elevated blood pressure readings today is indicated. Blood pressure medication was previously prescribed to support kidney function and manage high blood sugar levels.    Fasting blood glucose levels range from 110 to 120. An upcoming appointment with Dr. Troncoso to discuss the possibility of using Ozempic for weight loss is mentioned.      No complaints regarding medications.     The following portions of the patient's history were reviewed and updated as appropriate: allergies, current medications, past family history, past medical history, past social history, past surgical history and problem list.    Past Medical History  Past Medical History:   Diagnosis Date    Acquired trigger finger     Back pain     Chest pain     Elevated cholesterol     Hypercholesterolemia     Kidney stones     Type 1 diabetes mellitus        Surgical History  Past Surgical History:   Procedure Laterality Date    COLONOSCOPY      COLONOSCOPY N/A 9/18/2023    Procedure: COLONOSCOPY;  Surgeon: Johnathan Irvin MD;  Location: [facility];  Service: Gastroenterology;  Laterality: N/A;    WISDOM TOOTH EXTRACTION         Family History  Family History   Problem Relation Age of Onset    Cancer Mother     Hyperlipidemia Mother     Thyroid cancer Mother     Thyroid disease Mother     Diabetes Father     Hyperlipidemia Father     Heart disease Father         Quad bypass       Social History  Social History     Socioeconomic History    Marital status: Other   Tobacco Use    Smoking status: Never    Smokeless tobacco: Never   Vaping Use    Vaping status: Never Used   Substance and Sexual Activity    Alcohol use: Yes     Comment: Not weekly but on  "occasion    Drug use: Never    Sexual activity: Never       Objective   Vital Signs:   /68 (BP Location: Right arm, Patient Position: Sitting, Cuff Size: Large Adult)   Pulse 95   Temp 97.3 °F (36.3 °C) (Temporal)   Ht 172.7 cm (68\")   Wt 125 kg (276 lb)   SpO2 95%   BMI 41.97 kg/m²       Physical Exam     Physical Exam  Skin: Red, itchy areas behind ears, likely seborrheic dermatitis    Gen: Patient in NAD. Pleasant and answers appropriately. A&Ox3.    HEENT: NC/AT. No lesions noted. Conjunctiva clear, sclera nonicteric. PERRL. EOMI without nystagmus or strabismus. Fundi appear benign. No hemorrhages or exudates of eyes. Auditory canals are patent bilaterally without lesions. TMs intact,  nonerythematous, nonbulging without lesions. Nasal mucosa pink, nonerythematous, and nonedematous. Frontal and maxillary sinuses are nontender. O/P nonerythematous and moist without exudate.    Neck: Supple without lymph nodes palpated. FROM. No carotid bruits appreciated bilaterally.    Lungs: Slightly decreased B/L without rales, rhonchi, crackles, or wheezes.    Heart: RRR. S1 and S2 normal. No S3 or S4. No MRGT.    Abd: Soft, nontender,nondistended. (+)BSx4 quadrants.     Extrem: No CCE. Radial pulses 2+/4 and equal B/L. FROMx4.  Joint tenderness noted.    Neuro: No focal motor/sensory deficits.    Procedures    Result Review :   The following data was reviewed by: Magalis Malcolm MD on 04/25/2025:       Results             Assessment and Plan      Mendez Deluna is a 54 y.o. here for medical followup.    Diagnoses and all orders for this visit:    1. Uncontrolled type 1 diabetes mellitus with hypoglycemia without coma (Primary)  -     Cancel: Comprehensive Metabolic Panel; Future  -     Hemoglobin A1c; Future  -     Microalbumin / Creatinine Urine Ratio - Urine, Clean Catch; Future    2. Hyperlipidemia associated with type 2 diabetes mellitus  -     Cancel: Comprehensive Metabolic Panel; Future    3. Hypertension " associated with diabetes  -     Cancel: Comprehensive Metabolic Panel; Future    4. Tick bite, unspecified site, initial encounter  -     doxycycline (VIBRAMYCIN) 100 MG capsule; Take 2 capsules by mouth 1 time for 1 dose.  Dispense: 2 capsule; Refill: 0        Assessment & Plan  1. Eye bubbles.  - The etiology of the eye bubbles could be attributed to aging or a potential blockage in the tear duct.  - There is no evidence of thyroid dysfunction, and renal function appears to be within normal limits. Allergies cannot be ruled out as a possible cause.  - He was advised to consult with Dr. Newell regarding this issue.  - If the condition begins to affect his vision, further intervention will be necessary.    2. Immunization status.  - He was informed that he falls into the category where a measles vaccine booster is recommended.  - He was also educated about the new pneumonia 20 vaccine, which covers the top 20 pneumonia strains.  - He was advised to receive an MMR booster at [facility] or the health department.  - The decision to receive the pneumonia vaccine was left to his discretion.    3. Hyperlipidemia.  - He discontinued his cholesterol medication in 11/2024 and is awaiting the results of his cholesterol test.  - His cholesterol levels will be reviewed once the test results are available.  - He plans to resume the medication if the results are abnormal.  - Monitoring of lipid levels will continue to ensure appropriate management.    4. Tick bites.  - He reported feeling very drained three days ago and took Lasix twice that day.  - He also mentioned having two tick bites.  - A prescription for doxycycline was provided to prevent Lyme disease.  - A lab order was placed to test for Lyme disease during his next lab visit.    5. Weight management.  - He has been walking daily since 02/2025 but has not lost weight.  - He was advised to continue his current exercise regimen and consider incorporating 3-pound weights  into his routine.  - He was also encouraged to explore different activities that elevate his heart rate.  - Discussion included the possibility of muscle gain contributing to the lack of weight loss.    6. Suspected seborrheic dermatitis.  - He reported itching behind his ears, which gets red and irritated, especially when wearing glasses.  - He was advised to apply tea tree oil and apple cider vinegar to the affected areas.  - The condition will be monitored for any changes or need for further intervention.  - Evaluation of the effectiveness of these treatments will be conducted at the next visit.    7. Elevated blood pressure.  - His blood pressure was notably elevated during this visit.  - He was advised to monitor his blood pressure closely.  - If it remains high despite taking his blood pressure medication, he should contact the office immediately.  - Ongoing monitoring and potential adjustment of antihypertensive therapy will be necessary.    8. Type 1 diabetes mellitus.  - His fasting blood glucose levels range from 110 to 120.  - He was informed that Ozempic is not approved for weight loss in individuals with type 1 diabetes.  - He was advised to discuss this with Dr. Troncoso.  - Continued monitoring of blood glucose levels and diabetes management will be maintained.                 Patient or patient representative verbalized consent for the use of Ambient Listening during the visit with  Magalis Malcolm MD for chart documentation. 5/11/2025  23:42 EDT        Follow Up   Return in about 3 months (around 7/25/2025), or (ROR Dr. Troncoso).  Findings and plans discussed with patient who verbalizes understanding and agreement. Will followup with patient once results are in. Patient was given instructions and counseling regarding his condition or for health maintenance advice. Please see specific information pulled into the AVS if appropriate.       Magalis Malcolm MD

## 2025-07-07 RX ORDER — INSULIN PMP CART,AUT,G6/7,CNTR
1 EACH SUBCUTANEOUS EVERY OTHER DAY
Qty: 45 EACH | Refills: 1 | Status: SHIPPED | OUTPATIENT
Start: 2025-07-07

## 2025-07-13 DIAGNOSIS — K21.9 GASTROESOPHAGEAL REFLUX DISEASE WITHOUT ESOPHAGITIS: ICD-10-CM

## 2025-07-14 RX ORDER — PANTOPRAZOLE SODIUM 40 MG/1
40 TABLET, DELAYED RELEASE ORAL DAILY
Qty: 90 TABLET | Refills: 0 | Status: SHIPPED | OUTPATIENT
Start: 2025-07-14

## 2025-07-24 RX ORDER — ACYCLOVIR 400 MG/1
1 TABLET ORAL
Qty: 9 EACH | Refills: 3 | Status: SHIPPED | OUTPATIENT
Start: 2025-07-24

## 2025-07-29 ENCOUNTER — OFFICE VISIT (OUTPATIENT)
Dept: FAMILY MEDICINE CLINIC | Facility: CLINIC | Age: 55
End: 2025-07-29
Payer: MEDICAID

## 2025-07-29 ENCOUNTER — LAB (OUTPATIENT)
Dept: FAMILY MEDICINE CLINIC | Facility: CLINIC | Age: 55
End: 2025-07-29
Payer: MEDICAID

## 2025-07-29 VITALS
HEIGHT: 68 IN | OXYGEN SATURATION: 96 % | SYSTOLIC BLOOD PRESSURE: 138 MMHG | RESPIRATION RATE: 16 BRPM | TEMPERATURE: 98 F | BODY MASS INDEX: 42.89 KG/M2 | DIASTOLIC BLOOD PRESSURE: 76 MMHG | WEIGHT: 283 LBS | HEART RATE: 95 BPM

## 2025-07-29 DIAGNOSIS — R60.0 LOCALIZED EDEMA: ICD-10-CM

## 2025-07-29 DIAGNOSIS — M75.02 ADHESIVE CAPSULITIS OF BOTH SHOULDERS: ICD-10-CM

## 2025-07-29 DIAGNOSIS — R21 RASH: ICD-10-CM

## 2025-07-29 DIAGNOSIS — E11.69 HYPERLIPIDEMIA ASSOCIATED WITH TYPE 2 DIABETES MELLITUS: ICD-10-CM

## 2025-07-29 DIAGNOSIS — M72.2 PLANTAR FASCIITIS, BILATERAL: ICD-10-CM

## 2025-07-29 DIAGNOSIS — G89.29 CHRONIC BILATERAL BACK PAIN, UNSPECIFIED BACK LOCATION: ICD-10-CM

## 2025-07-29 DIAGNOSIS — M75.01 ADHESIVE CAPSULITIS OF BOTH SHOULDERS: ICD-10-CM

## 2025-07-29 DIAGNOSIS — M54.9 CHRONIC BILATERAL BACK PAIN, UNSPECIFIED BACK LOCATION: ICD-10-CM

## 2025-07-29 DIAGNOSIS — E11.59 HYPERTENSION ASSOCIATED WITH DIABETES: ICD-10-CM

## 2025-07-29 DIAGNOSIS — E78.5 HYPERLIPIDEMIA ASSOCIATED WITH TYPE 2 DIABETES MELLITUS: ICD-10-CM

## 2025-07-29 DIAGNOSIS — E10.649 UNCONTROLLED TYPE 1 DIABETES MELLITUS WITH HYPOGLYCEMIA WITHOUT COMA: ICD-10-CM

## 2025-07-29 DIAGNOSIS — I15.2 HYPERTENSION ASSOCIATED WITH DIABETES: ICD-10-CM

## 2025-07-29 DIAGNOSIS — R21 RASH: Primary | ICD-10-CM

## 2025-07-29 PROCEDURE — 80053 COMPREHEN METABOLIC PANEL: CPT | Performed by: FAMILY MEDICINE

## 2025-07-29 PROCEDURE — 80061 LIPID PANEL: CPT | Performed by: FAMILY MEDICINE

## 2025-07-29 PROCEDURE — 86757 RICKETTSIA ANTIBODY: CPT | Performed by: FAMILY MEDICINE

## 2025-07-29 PROCEDURE — 86666 EHRLICHIA ANTIBODY: CPT | Performed by: FAMILY MEDICINE

## 2025-07-29 PROCEDURE — 83036 HEMOGLOBIN GLYCOSYLATED A1C: CPT | Performed by: FAMILY MEDICINE

## 2025-07-29 PROCEDURE — 82570 ASSAY OF URINE CREATININE: CPT | Performed by: FAMILY MEDICINE

## 2025-07-29 PROCEDURE — 86618 LYME DISEASE ANTIBODY: CPT | Performed by: FAMILY MEDICINE

## 2025-07-29 PROCEDURE — 82043 UR ALBUMIN QUANTITATIVE: CPT | Performed by: FAMILY MEDICINE

## 2025-07-29 RX ORDER — LISINOPRIL 10 MG/1
10 TABLET ORAL DAILY
Qty: 90 TABLET | Refills: 1 | Status: SHIPPED | OUTPATIENT
Start: 2025-07-29

## 2025-07-29 RX ORDER — KETOCONAZOLE 20 MG/ML
SHAMPOO, SUSPENSION TOPICAL 2 TIMES WEEKLY
Qty: 240 ML | Refills: 0 | Status: SHIPPED | OUTPATIENT
Start: 2025-07-31

## 2025-07-29 RX ORDER — FUROSEMIDE 40 MG/1
40 TABLET ORAL DAILY
Qty: 90 TABLET | Refills: 1 | Status: SHIPPED | OUTPATIENT
Start: 2025-07-29

## 2025-07-29 RX ORDER — KETOROLAC TROMETHAMINE 5 MG/ML
1 SOLUTION OPHTHALMIC 4 TIMES DAILY
COMMUNITY
Start: 2025-07-25

## 2025-07-29 RX ORDER — CLOTRIMAZOLE 1 %
1 CREAM (GRAM) TOPICAL 2 TIMES DAILY
Qty: 226 G | Refills: 1 | Status: SHIPPED | OUTPATIENT
Start: 2025-07-29

## 2025-07-29 RX ORDER — PREDNISOLONE ACETATE 10 MG/ML
1 SUSPENSION/ DROPS OPHTHALMIC 4 TIMES DAILY
COMMUNITY
Start: 2025-07-25 | End: 2025-07-29

## 2025-07-29 RX ORDER — OFLOXACIN 3 MG/ML
1 SOLUTION/ DROPS OPHTHALMIC 4 TIMES DAILY
COMMUNITY
Start: 2025-07-25 | End: 2025-07-29

## 2025-07-30 LAB
ALBUMIN SERPL-MCNC: 4.1 G/DL (ref 3.8–4.9)
ALBUMIN/CREAT UR: 7 MG/G CREAT (ref 0–29)
ALP SERPL-CCNC: 55 IU/L (ref 44–121)
ALT SERPL-CCNC: 20 IU/L (ref 0–44)
AST SERPL-CCNC: 15 IU/L (ref 0–40)
B BURGDOR IGG+IGM SER QL IA: NEGATIVE
BILIRUB SERPL-MCNC: 0.3 MG/DL (ref 0–1.2)
BUN SERPL-MCNC: 15 MG/DL (ref 6–24)
BUN/CREAT SERPL: 21 (ref 9–20)
CALCIUM SERPL-MCNC: 8.7 MG/DL (ref 8.7–10.2)
CHLORIDE SERPL-SCNC: 104 MMOL/L (ref 96–106)
CHOLEST SERPL-MCNC: 228 MG/DL (ref 100–199)
CO2 SERPL-SCNC: 22 MMOL/L (ref 20–29)
CREAT SERPL-MCNC: 0.73 MG/DL (ref 0.76–1.27)
CREAT UR-MCNC: 244.3 MG/DL
EGFRCR SERPLBLD CKD-EPI 2021: 107 ML/MIN/1.73
GLOBULIN SER CALC-MCNC: 2.5 G/DL (ref 1.5–4.5)
GLUCOSE SERPL-MCNC: 111 MG/DL (ref 70–99)
HBA1C MFR BLD: 6.2 % (ref 4.8–5.6)
HDLC SERPL-MCNC: 36 MG/DL
LDLC SERPL CALC-MCNC: 173 MG/DL (ref 0–99)
MICROALBUMIN UR-MCNC: 18.1 UG/ML
POTASSIUM SERPL-SCNC: 4.3 MMOL/L (ref 3.5–5.2)
PROT SERPL-MCNC: 6.6 G/DL (ref 6–8.5)
SODIUM SERPL-SCNC: 140 MMOL/L (ref 134–144)
TRIGL SERPL-MCNC: 105 MG/DL (ref 0–149)
VLDLC SERPL CALC-MCNC: 19 MG/DL (ref 5–40)

## 2025-07-31 LAB
RESULT COMMENT:: NORMAL
RICK SF IGG TITR SER: NORMAL {TITER}
RICK SF IGM TITR SER: NORMAL {TITER}

## 2025-08-01 LAB
A PHAGOCYTOPH IGG TITR SER IF: NEGATIVE {TITER}
A PHAGOCYTOPH IGM TITR SER IF: NEGATIVE {TITER}
E CHAFFEENSIS IGG TITR SER IF: NEGATIVE {TITER}
E CHAFFEENSIS IGM TITR SER IF: NEGATIVE {TITER}
RESULT COMMENT:: NORMAL

## 2025-08-14 ENCOUNTER — OFFICE VISIT (OUTPATIENT)
Dept: ENDOCRINOLOGY | Facility: CLINIC | Age: 55
End: 2025-08-14
Payer: MEDICAID

## 2025-08-14 VITALS
HEART RATE: 77 BPM | WEIGHT: 282.8 LBS | SYSTOLIC BLOOD PRESSURE: 170 MMHG | BODY MASS INDEX: 43 KG/M2 | DIASTOLIC BLOOD PRESSURE: 77 MMHG | OXYGEN SATURATION: 95 %

## 2025-08-14 DIAGNOSIS — E10.649 UNCONTROLLED TYPE 1 DIABETES MELLITUS WITH HYPOGLYCEMIA WITHOUT COMA: Primary | ICD-10-CM

## 2025-08-14 PROCEDURE — 1159F MED LIST DOCD IN RCRD: CPT | Performed by: INTERNAL MEDICINE

## 2025-08-14 PROCEDURE — 3044F HG A1C LEVEL LT 7.0%: CPT | Performed by: INTERNAL MEDICINE

## 2025-08-14 PROCEDURE — 99213 OFFICE O/P EST LOW 20 MIN: CPT | Performed by: INTERNAL MEDICINE

## 2025-08-14 PROCEDURE — 1160F RVW MEDS BY RX/DR IN RCRD: CPT | Performed by: INTERNAL MEDICINE

## (undated) DEVICE — 4-PORT MANIFOLD: Brand: NEPTUNE 2

## (undated) DEVICE — TUBING, SUCTION, 1/4" X 20', STRAIGHT: Brand: MEDLINE INDUSTRIES, INC.

## (undated) DEVICE — ST LINER SAFECAP GRN RED CP STRL

## (undated) DEVICE — Device: Brand: DEFENDO AIR/WATER/SUCTION AND BIOPSY VALVE

## (undated) DEVICE — ADAPT CLN LUM OLYMP AIR/H20

## (undated) DEVICE — FIRST STEP BEDSIDE ADD WATER KIT - RESEALABLE STAND-UP POUCH, ENDOSCOPIC CLEANING PAD - 1 POUCH: Brand: FIRST STEP BEDSIDE ADD WATER KIT - RESEALABLE STAND-UP POUCH, ENDOSCOPIC CLEANIN

## (undated) DEVICE — CONN Y IRR DISP 1P/U

## (undated) DEVICE — Device

## (undated) DEVICE — KT ORCA ORCAPOD DISP STRL